# Patient Record
Sex: FEMALE | Employment: FULL TIME | URBAN - METROPOLITAN AREA
[De-identification: names, ages, dates, MRNs, and addresses within clinical notes are randomized per-mention and may not be internally consistent; named-entity substitution may affect disease eponyms.]

---

## 2016-08-04 LAB
HBV SURFACE AG SERPL QL IA: NORMAL
PLATELET # BLD AUTO: NORMAL 10^9/L

## 2016-10-18 LAB
C TRACH DNA SPEC QL PROBE+SIG AMP: NEGATIVE
N GONORRHOEA DNA SPEC QL PROBE+SIG AMP: NEGATIVE

## 2017-01-07 LAB — GROUP B STREP PCR: NEGATIVE

## 2017-01-29 ENCOUNTER — HOSPITAL ENCOUNTER (INPATIENT)
Facility: CLINIC | Age: 27
LOS: 2 days | Discharge: HOME HEALTH | End: 2017-02-01
Attending: OBSTETRICS & GYNECOLOGY | Admitting: OBSTETRICS & GYNECOLOGY
Payer: COMMERCIAL

## 2017-01-29 PROBLEM — Z36.89 ENCOUNTER FOR TRIAGE IN PREGNANT PATIENT: Status: ACTIVE | Noted: 2017-01-29

## 2017-01-29 PROCEDURE — 99215 OFFICE O/P EST HI 40 MIN: CPT | Mod: 25

## 2017-01-29 RX ORDER — PRENATAL VIT/IRON FUM/FOLIC AC 27MG-0.8MG
1 TABLET ORAL DAILY
COMMUNITY

## 2017-01-29 NOTE — IP AVS SNAPSHOT
MRN:[id number]                      After Visit Summary   1/29/2017    Tina Francois    MRN: [id number]           Thank you!     Thank you for choosing [facility] for your care. Our goal is always to provide you with excellent care. Hearing back from our patients is one way we can continue to improve our services. Please take a few minutes to complete the written survey that you may receive in the mail after you visit. If you would like to speak to someone directly about your visit please contact Patient Relations at [phone]. Thank you!          Patient Information     Date Of Birth          1990        About your hospital stay     You were admitted on:  January 29, 2017 You last received care in the:  [facility] Postpartum    You were discharged on:  February 1, 2017       Who to Call     For medical emergencies, please call 911.  For non-urgent questions about your medical care, please call your primary care provider or clinic, [phone]          Attending Provider     Provider    Shanna Rowell MD       Primary Care Provider Office Phone # Fax #    Jake Clayton Cool [phone]       [facility] [address]  [place] [address]        Further instructions from your care team       Postpartum Vaginal Delivery Instructions  [facility] Lactation: [phone]  [facility]: [phone]  Follow up with your primary OB in 6 weeks       Activity       Ask family and friends for help when you need it.    Do not place anything in your vagina for 6 weeks.    You are not restricted on other activities, but take it easy for a few weeks to allow your body to recover from delivery.  You are able to do any activities you feel up to that point.    No driving until you have stopped taking your pain medications (usually two weeks after delivery).     Call your health care provider if you have any of these symptoms:        Increased pain, swelling, redness, or fluid around your stiches from an episiotomy or perineal tear.    A fever above 100.4 F (38 C) with or without chills when placing a thermometer under your tongue.    You soak a sanitary pad with blood within 1 hour, or you see blood clots larger than a golf ball.    Bleeding that lasts more than 6 weeks.    Vaginal discharge that smells bad.    Severe pain, cramping or tenderness in your lower belly area.    A need to urinate more frequently (use the toilet more often), more urgently (use the toilet very quickly), or it burns when you urinate.    Nausea and vomiting.    Redness, swelling or pain around a vein in your leg.    Problems breastfeeding or a red or painful area on your breast.    Chest pain and cough or are gasping for air.    Problems coping with sadness, anxiety, or depression.  If you have any concerns about hurting yourself or the baby, call your provider immediately.     You have questions or concerns after you return home.     Keep your hands clean:  Always wash your hands before touching your perineal area and stitches.  This helps reduce your risk of infection.  If your hands aren't dirty, you may use an alcohol hand-rub to clean your hands. Keep your nails clean and short.        Pending Results     No orders found from 1/28/2017 to 1/30/2017.            Statement of Approval     Ordered          02/01/17 0758  I have reviewed and agree with all the recommendations and orders detailed in this document.   EFFECTIVE NOW     Approved and electronically signed by:  Sayda Carlson PA-C             Admission Information        Provider Department Dept Phone    1/29/2017 Shanna Rowell MD  Postpartum [phone]      Your Vitals Were     Blood Pressure Pulse Temperature    124/84 mmHg 81 98.1  F (36.7  C) (Oral)    Respirations Height Weight    18 1.524 m (5') 66.225 kg (146 lb)    BMI (Body Mass Index) Last Period       28.51 kg/m2 05/01/2016  "(LMP Unknown)       [employer] Information     [employer] lets you send messages to your doctor, view your test results, renew your prescriptions, schedule appointments and more. To sign up, go to [email or web address] . Click on \"Log in\" on the left side of the screen, which will take you to the Welcome page. Then click on \"Sign up Now\" on the right side of the page.     You will be asked to enter the access code listed below, as well as some personal information. Please follow the directions to create your username and password.     Your access code is: [email or web address]  Expires: 2017  8:19 AM     Your access code will  in 90 days. If you need help or a new code, please call your [facility] clinic or [phone].        Care EveryWhere ID     This is your Care EveryWhere ID. This could be used by other organizations to access your [facility] medical records  [id number]           Review of your medicines      START taking        Dose / Directions    acetaminophen 325 MG tablet   Commonly known as:  TYLENOL   Used for:   (normal spontaneous vaginal delivery)        Dose:  650 mg   Take 2 tablets (650 mg) by mouth every 4 hours as needed for mild pain or fever (greater than or equal to 38? C /100.4? F (oral) or 38.5? C/ 101.4? F (core).)   Quantity:  100 tablet   Refills:  0       hydrocortisone 2.5 % cream   Used for:   (normal spontaneous vaginal delivery)        Place rectally 3 times daily as needed (hemorrhoids)   Refills:  0       ibuprofen 400 MG tablet   Commonly known as:  ADVIL/MOTRIN   Used for:   (normal spontaneous vaginal delivery)        Dose:  400-800 mg   Take 1-2 tablets (400-800 mg) by mouth every 6 hours as needed for other (cramping)   Quantity:  120 tablet   Refills:  0       lanolin ointment   Used for:   (normal spontaneous vaginal delivery)        Apply topically every hour as needed for dry skin (sore nipples)   Refills:  0       senna-docusate 8.6-50 MG per tablet "   Commonly known as:  SENOKOT-S;PERICOLACE   Used for:   (normal spontaneous vaginal delivery)        Dose:  1-2 tablet   Take 1-2 tablets by mouth 2 times daily   Quantity:  100 tablet   Refills:  0         CONTINUE these medicines which have NOT CHANGED        Dose / Directions    prenatal multivitamin  plus iron 27-0.8 MG Tabs per tablet        Dose:  1 tablet   Take 1 tablet by mouth daily   Refills:  0       VITAMIN D (CHOLECALCIFEROL) PO        Take by mouth daily   Refills:  0            Where to get your medicines      Some of these will need a paper prescription and others can be bought over the counter. Ask your nurse if you have questions.     You don't need a prescription for these medications    - acetaminophen 325 MG tablet  - hydrocortisone 2.5 % cream  - ibuprofen 400 MG tablet  - lanolin ointment  - senna-docusate 8.6-50 MG per tablet             Protect others around you: Learn how to safely use, store and throw away your medicines at www.disposemymeds.org.             Medication List: This is a list of all your medications and when to take them. Check marks below indicate your daily home schedule. Keep this list as a reference.      Medications           Morning Afternoon Evening Bedtime As Needed    acetaminophen 325 MG tablet   Commonly known as:  TYLENOL   Take 2 tablets (650 mg) by mouth every 4 hours as needed for mild pain or fever (greater than or equal to 38? C /100.4? F (oral) or 38.5? C/ 101.4? F (core).)                                hydrocortisone 2.5 % cream   Place rectally 3 times daily as needed (hemorrhoids)                                ibuprofen 400 MG tablet   Commonly known as:  ADVIL/MOTRIN   Take 1-2 tablets (400-800 mg) by mouth every 6 hours as needed for other (cramping)   Last time this was given:  800 mg on 2017  3:48 AM                                lanolin ointment   Apply topically every hour as needed for dry skin (sore nipples)                                 prenatal multivitamin  plus iron 27-0.8 MG Tabs per tablet   Take 1 tablet by mouth daily                                senna-docusate 8.6-50 MG per tablet   Commonly known as:  SENOKOT-S;PERICOLACE   Take 1-2 tablets by mouth 2 times daily   Last time this was given:  1 tablet on 1/31/2017  9:24 PM                                VITAMIN D (CHOLECALCIFEROL) PO   Take by mouth daily

## 2017-01-29 NOTE — IP AVS SNAPSHOT
[facility] Postpartum    [address] [facility] [address]    Phone:  [phone]    Fax:  [phone]                                       After Visit Summary   1/29/2017    Tina Francois    MRN: [id number]           After Visit Summary Signature Page     I have received my discharge instructions, and my questions have been answered. I have discussed any challenges I see with this plan with the nurse or doctor.    ..........................................................................................................................................  Patient/Patient Representative Signature      ..........................................................................................................................................  Patient Representative Print Name and Relationship to Patient    ..................................................               ................................................  Date                                            Time    ..........................................................................................................................................  Reviewed by Signature/Title    ...................................................              ..............................................  Date                                                            Time

## 2017-01-30 ENCOUNTER — ANESTHESIA EVENT (OUTPATIENT)
Dept: OBGYN | Facility: CLINIC | Age: 27
End: 2017-01-30
Payer: COMMERCIAL

## 2017-01-30 ENCOUNTER — ANESTHESIA (OUTPATIENT)
Dept: OBGYN | Facility: CLINIC | Age: 27
End: 2017-01-30
Payer: COMMERCIAL

## 2017-01-30 LAB
ABO + RH BLD: NORMAL
ABO + RH BLD: NORMAL
SPECIMEN EXP DATE BLD: NORMAL
T PALLIDUM IGG+IGM SER QL: NEGATIVE

## 2017-01-30 PROCEDURE — 25000125 ZZHC RX 250

## 2017-01-30 PROCEDURE — 72200001 ZZH LABOR CARE VAGINAL DELIVERY SINGLE

## 2017-01-30 PROCEDURE — 12000029 ZZH R&B OB INTERMEDIATE

## 2017-01-30 PROCEDURE — 40000671 ZZH STATISTIC ANESTHESIA CASE

## 2017-01-30 PROCEDURE — 25000125 ZZHC RX 250: Performed by: OBSTETRICS & GYNECOLOGY

## 2017-01-30 PROCEDURE — 3E0S3CZ INTRODUCTION OF REGIONAL ANESTHETIC INTO EPIDURAL SPACE, PERCUTANEOUS APPROACH: ICD-10-PCS | Performed by: OBSTETRICS & GYNECOLOGY

## 2017-01-30 PROCEDURE — 86901 BLOOD TYPING SEROLOGIC RH(D): CPT | Performed by: OBSTETRICS & GYNECOLOGY

## 2017-01-30 PROCEDURE — 37000011 ZZH ANESTHESIA WARD SERVICE

## 2017-01-30 PROCEDURE — 25000128 H RX IP 250 OP 636

## 2017-01-30 PROCEDURE — 25000132 ZZH RX MED GY IP 250 OP 250 PS 637: Performed by: OBSTETRICS & GYNECOLOGY

## 2017-01-30 PROCEDURE — 86900 BLOOD TYPING SEROLOGIC ABO: CPT | Performed by: OBSTETRICS & GYNECOLOGY

## 2017-01-30 PROCEDURE — 86780 TREPONEMA PALLIDUM: CPT | Performed by: OBSTETRICS & GYNECOLOGY

## 2017-01-30 PROCEDURE — 0KQM0ZZ REPAIR PERINEUM MUSCLE, OPEN APPROACH: ICD-10-PCS | Performed by: OBSTETRICS & GYNECOLOGY

## 2017-01-30 PROCEDURE — 10907ZC DRAINAGE OF AMNIOTIC FLUID, THERAPEUTIC FROM PRODUCTS OF CONCEPTION, VIA NATURAL OR ARTIFICIAL OPENING: ICD-10-PCS | Performed by: OBSTETRICS & GYNECOLOGY

## 2017-01-30 PROCEDURE — 25800025 ZZH RX 258: Performed by: OBSTETRICS & GYNECOLOGY

## 2017-01-30 PROCEDURE — 00HU33Z INSERTION OF INFUSION DEVICE INTO SPINAL CANAL, PERCUTANEOUS APPROACH: ICD-10-PCS | Performed by: OBSTETRICS & GYNECOLOGY

## 2017-01-30 RX ORDER — OXYTOCIN 10 [USP'U]/ML
10 INJECTION, SOLUTION INTRAMUSCULAR; INTRAVENOUS
Status: DISCONTINUED | OUTPATIENT
Start: 2017-01-30 | End: 2017-01-30

## 2017-01-30 RX ORDER — ONDANSETRON 2 MG/ML
4 INJECTION INTRAMUSCULAR; INTRAVENOUS EVERY 6 HOURS PRN
Status: DISCONTINUED | OUTPATIENT
Start: 2017-01-30 | End: 2017-01-30

## 2017-01-30 RX ORDER — OXYTOCIN/0.9 % SODIUM CHLORIDE 30/500 ML
340 PLASTIC BAG, INJECTION (ML) INTRAVENOUS CONTINUOUS PRN
Status: DISCONTINUED | OUTPATIENT
Start: 2017-01-30 | End: 2017-02-01 | Stop reason: HOSPADM

## 2017-01-30 RX ORDER — SODIUM CHLORIDE, SODIUM LACTATE, POTASSIUM CHLORIDE, CALCIUM CHLORIDE 600; 310; 30; 20 MG/100ML; MG/100ML; MG/100ML; MG/100ML
INJECTION, SOLUTION INTRAVENOUS CONTINUOUS
Status: DISCONTINUED | OUTPATIENT
Start: 2017-01-30 | End: 2017-01-30

## 2017-01-30 RX ORDER — OXYTOCIN/0.9 % SODIUM CHLORIDE 30/500 ML
100-340 PLASTIC BAG, INJECTION (ML) INTRAVENOUS CONTINUOUS PRN
Status: COMPLETED | OUTPATIENT
Start: 2017-01-30 | End: 2017-01-30

## 2017-01-30 RX ORDER — IBUPROFEN 800 MG/1
800 TABLET, FILM COATED ORAL
Status: DISCONTINUED | OUTPATIENT
Start: 2017-01-30 | End: 2017-01-30

## 2017-01-30 RX ORDER — IBUPROFEN 400 MG/1
400-800 TABLET, FILM COATED ORAL EVERY 6 HOURS PRN
Status: DISCONTINUED | OUTPATIENT
Start: 2017-01-30 | End: 2017-02-01 | Stop reason: HOSPADM

## 2017-01-30 RX ORDER — OXYCODONE AND ACETAMINOPHEN 5; 325 MG/1; MG/1
1 TABLET ORAL
Status: DISCONTINUED | OUTPATIENT
Start: 2017-01-30 | End: 2017-01-30

## 2017-01-30 RX ORDER — NALOXONE HYDROCHLORIDE 0.4 MG/ML
.1-.4 INJECTION, SOLUTION INTRAMUSCULAR; INTRAVENOUS; SUBCUTANEOUS
Status: DISCONTINUED | OUTPATIENT
Start: 2017-01-30 | End: 2017-01-30

## 2017-01-30 RX ORDER — BISACODYL 10 MG
10 SUPPOSITORY, RECTAL RECTAL DAILY PRN
Status: DISCONTINUED | OUTPATIENT
Start: 2017-02-01 | End: 2017-02-01 | Stop reason: HOSPADM

## 2017-01-30 RX ORDER — METHYLERGONOVINE MALEATE 0.2 MG/ML
200 INJECTION INTRAVENOUS
Status: DISCONTINUED | OUTPATIENT
Start: 2017-01-30 | End: 2017-01-30

## 2017-01-30 RX ORDER — FENTANYL CITRATE 50 UG/ML
50-100 INJECTION, SOLUTION INTRAMUSCULAR; INTRAVENOUS
Status: DISCONTINUED | OUTPATIENT
Start: 2017-01-30 | End: 2017-01-30

## 2017-01-30 RX ORDER — EPHEDRINE SULFATE 50 MG/ML
INJECTION, SOLUTION INTRAMUSCULAR; INTRAVENOUS; SUBCUTANEOUS
Status: COMPLETED
Start: 2017-01-30 | End: 2017-01-30

## 2017-01-30 RX ORDER — LANOLIN 100 %
OINTMENT (GRAM) TOPICAL
Status: DISCONTINUED | OUTPATIENT
Start: 2017-01-30 | End: 2017-02-01 | Stop reason: HOSPADM

## 2017-01-30 RX ORDER — OXYTOCIN/0.9 % SODIUM CHLORIDE 30/500 ML
100 PLASTIC BAG, INJECTION (ML) INTRAVENOUS CONTINUOUS
Status: DISCONTINUED | OUTPATIENT
Start: 2017-01-30 | End: 2017-02-01 | Stop reason: HOSPADM

## 2017-01-30 RX ORDER — EPHEDRINE SULFATE 50 MG/ML
5 INJECTION, SOLUTION INTRAMUSCULAR; INTRAVENOUS; SUBCUTANEOUS
Status: DISCONTINUED | OUTPATIENT
Start: 2017-01-30 | End: 2017-01-30

## 2017-01-30 RX ORDER — LIDOCAINE 40 MG/G
CREAM TOPICAL
Status: DISCONTINUED | OUTPATIENT
Start: 2017-01-30 | End: 2017-01-30

## 2017-01-30 RX ORDER — AMOXICILLIN 250 MG
1-2 CAPSULE ORAL 2 TIMES DAILY
Status: DISCONTINUED | OUTPATIENT
Start: 2017-01-30 | End: 2017-02-01 | Stop reason: HOSPADM

## 2017-01-30 RX ORDER — HYDROCORTISONE 2.5 %
CREAM (GRAM) TOPICAL 3 TIMES DAILY PRN
Status: DISCONTINUED | OUTPATIENT
Start: 2017-01-30 | End: 2017-02-01 | Stop reason: HOSPADM

## 2017-01-30 RX ORDER — CARBOPROST TROMETHAMINE 250 UG/ML
250 INJECTION, SOLUTION INTRAMUSCULAR
Status: DISCONTINUED | OUTPATIENT
Start: 2017-01-30 | End: 2017-01-30

## 2017-01-30 RX ORDER — ACETAMINOPHEN 325 MG/1
650 TABLET ORAL EVERY 4 HOURS PRN
Status: DISCONTINUED | OUTPATIENT
Start: 2017-01-30 | End: 2017-01-30

## 2017-01-30 RX ORDER — MISOPROSTOL 200 UG/1
400 TABLET ORAL
Status: DISCONTINUED | OUTPATIENT
Start: 2017-01-30 | End: 2017-02-01 | Stop reason: HOSPADM

## 2017-01-30 RX ORDER — NALBUPHINE HYDROCHLORIDE 10 MG/ML
2.5-5 INJECTION, SOLUTION INTRAMUSCULAR; INTRAVENOUS; SUBCUTANEOUS EVERY 6 HOURS PRN
Status: DISCONTINUED | OUTPATIENT
Start: 2017-01-30 | End: 2017-01-30

## 2017-01-30 RX ORDER — ACETAMINOPHEN 325 MG/1
650 TABLET ORAL EVERY 4 HOURS PRN
Status: DISCONTINUED | OUTPATIENT
Start: 2017-01-30 | End: 2017-02-01 | Stop reason: HOSPADM

## 2017-01-30 RX ORDER — OXYTOCIN 10 [USP'U]/ML
10 INJECTION, SOLUTION INTRAMUSCULAR; INTRAVENOUS
Status: DISCONTINUED | OUTPATIENT
Start: 2017-01-30 | End: 2017-02-01 | Stop reason: HOSPADM

## 2017-01-30 RX ORDER — NALOXONE HYDROCHLORIDE 0.4 MG/ML
.1-.4 INJECTION, SOLUTION INTRAMUSCULAR; INTRAVENOUS; SUBCUTANEOUS
Status: DISCONTINUED | OUTPATIENT
Start: 2017-01-30 | End: 2017-02-01 | Stop reason: HOSPADM

## 2017-01-30 RX ORDER — TERBUTALINE SULFATE 1 MG/ML
INJECTION, SOLUTION SUBCUTANEOUS
Status: DISCONTINUED
Start: 2017-01-30 | End: 2017-01-30 | Stop reason: WASHOUT

## 2017-01-30 RX ADMIN — SENNOSIDES AND DOCUSATE SODIUM 1 TABLET: 8.6; 5 TABLET ORAL at 21:27

## 2017-01-30 RX ADMIN — IBUPROFEN 800 MG: 400 TABLET ORAL at 09:00

## 2017-01-30 RX ADMIN — Medication: at 02:02

## 2017-01-30 RX ADMIN — SODIUM CHLORIDE, POTASSIUM CHLORIDE, SODIUM LACTATE AND CALCIUM CHLORIDE: 600; 310; 30; 20 INJECTION, SOLUTION INTRAVENOUS at 02:13

## 2017-01-30 RX ADMIN — SENNOSIDES AND DOCUSATE SODIUM 2 TABLET: 8.6; 5 TABLET ORAL at 09:00

## 2017-01-30 RX ADMIN — Medication 5 MG: at 03:05

## 2017-01-30 RX ADMIN — SODIUM CHLORIDE, POTASSIUM CHLORIDE, SODIUM LACTATE AND CALCIUM CHLORIDE 1000 ML: 600; 310; 30; 20 INJECTION, SOLUTION INTRAVENOUS at 01:28

## 2017-01-30 RX ADMIN — SODIUM CHLORIDE, POTASSIUM CHLORIDE, SODIUM LACTATE AND CALCIUM CHLORIDE: 600; 310; 30; 20 INJECTION, SOLUTION INTRAVENOUS at 03:36

## 2017-01-30 RX ADMIN — OXYTOCIN-SODIUM CHLORIDE 0.9% IV SOLN 30 UNIT/500ML 340 ML/HR: 30-0.9/5 SOLUTION at 06:10

## 2017-01-30 RX ADMIN — EPHEDRINE SULFATE 5 MG: 50 INJECTION, SOLUTION INTRAMUSCULAR; INTRAVENOUS; SUBCUTANEOUS at 03:05

## 2017-01-30 RX ADMIN — IBUPROFEN 800 MG: 400 TABLET ORAL at 15:01

## 2017-01-30 NOTE — PROGRESS NOTES
Data: Tina Francois transferred to [facility] via wheelchair at 845am. Baby transferred via mother;'s arms.  Action: Receiving unit notified of transfer: Yes. Patient and family notified of room change. Report given to Tahira COY RN at 0815. Belongings sent to receiving unit. Accompanied by Registered Nurse. Oriented patient to surroundings. Call light within reach. ID bands double-checked with receiving RN.  Response: Patient tolerated transfer and is stable. Pt yet to void post delivery. Breast feeding with shield.

## 2017-01-30 NOTE — PROVIDER NOTIFICATION
01/30/17 0224   Provider Notification   Provider Name/Title Dr. Horan   Method of Notification In Department   Request Evaluate in Person   In house MD called to bedside at 0224 for PD after epidural placement. PD down to 70's despite position changes (L/R/ hands and knees), IV bolus, 02. SVE  From RN 4.5 cm, intact. BP WDL with exception of one low BP 4.5 min prior. In house called to bedside. Dr. Horan AROM pt for clear fluid and SVE 5cm. FSE placed. During this time FHR resolved 135 with moderate variability. Total time of PD 8 minutes. MD at bedside to monitor. RN to continue to monitor.

## 2017-01-30 NOTE — PROVIDER NOTIFICATION
01/30/17 0323   Provider Notification   Provider Name/Title Dr. Rowell   Method of Notification Phone   Request Evaluate - Remote   MD called unit after reviewing strip at home. Aware of recurrent VD. SVE is 6.5/100/0. Pt was just repositioned and catheter placed. No new orders received.

## 2017-01-30 NOTE — ANESTHESIA PROCEDURE NOTES
Peripheral nerve/Neuraxial procedure note :        Assessment/Narrative  .  .  . Comments:  Pre-Procedure  Performed by Ge Mccormick MD  Location: OB.      PreAnesthestic Checklist: patient identified, IV checked, risks and benefits discussed, informed consent obtained, monitors and equipment checked, pre-op evaluation and at physician/surgeon's request.    Timeout   Correct Patient: Yes  Correct Procedure: Epidural catheter placement  Correct Site: Yes   Correct Position: Yes    Procedure Documentation  Procedure:   Epidural catheter block for Labor    Patient currently in labor and she and OBMD request a labor epidural to control her labor pains. Patient was interviewed and examined. Procedure and risks including but not limited to bleeding, infection, nerve injury, paralysis, PDPH, and inadequate block requiring intervention discussed with patient. Questions answered. This epidural is to be placed in anticipation of vaginal delivery.  She consents to the epidural procedure.  Time-out was performed.  I or my partners remain immediately available for management of any issues or complications and will monitor at appropriate intervals.  Procedure: Patient sitting. Betadine prep x 3. Sterile drape applied.  Lidocaine 1%  local infiltration at L 3-4.  17 G. Tuohy needle at L3-4 by loss of resistance into epidural space.  No CSF, paresthesia or blood. 1.5 % Lidocaine with 1:200,000 Epinephrine 5cc test dose. Then 0.25% bupivicaine 10 cc with NS 5 cc.  Epidural catheter inserted w/o resistance to 5 cm in epidural space.  Aspiration negative for blood and CSF.   Negative for neuro change, paresthesia or symptoms of intravascular injection or intrathecal injection.  Infusion orders written and infusion of 0.125% bupivicaine 15cc per hour started.    Ge Mccormick MD

## 2017-01-30 NOTE — PROVIDER NOTIFICATION
01/30/17 0402   Provider Notification   Provider Name/Title Dr. Rowell   Method of Notification Phone   Request Evaluate - Remote   Notification Reason Status Update   MD updated that pt continues to have recurrent VD with contractions, moderate variability between decels. Bloody show noted. Pt denies any pressure or urge to push. Orders received to perform SVE around 0430.

## 2017-01-30 NOTE — BRIEF OP NOTE
Delivery Summary:    1. IUP at 39+1/7 wks. Spontaneous labor.   2. AROM with clear fluid, meconium staning through labor.   3. Epidural for pain management.   4.  of male infant in MARYAN presentation. NICU present. No nuchal cord. Delayed clamp done.  5. Placenta intact with 3-v cord. Pitocin after.   6. Perineum with 2nd degree lac and left labial lac repaired with 3-0 and 4-0 vicryl.   7. Dr. Rowell for delivery.     Shanna Rowell

## 2017-01-30 NOTE — PLAN OF CARE
Problem: Individualization  Goal: Patient Preferences  Outcome: No Change  Pt admitted to mom-baby and VSS. Voiding well and up independently. Ice and ibu with relief of pain.  at bedside and supportive. Using nipple shield with feedings.

## 2017-01-30 NOTE — PROVIDER NOTIFICATION
01/30/17 0417   Provider Notification   Provider Name/Title Dr. Rowell   Method of Notification Phone   MD called to department for delivery

## 2017-01-30 NOTE — ANESTHESIA PREPROCEDURE EVALUATION
PAC NOTE:       ANESTHESIA PRE EVALUATION:  Anesthesia Evaluation       history and physical reviewed .        ROS/MED HX    ENT/Pulmonary:  - neg pulmonary ROS     Neurologic:  - neg neurologic ROS     Cardiovascular:  - neg cardiovascular ROS       METS/Exercise Tolerance:     Hematologic:         Musculoskeletal:         GI/Hepatic:  - neg GI/hepatic ROS       Renal/Genitourinary:         Endo:         Psychiatric:         Infectious Disease:         Malignancy:         Other:               Physical Exam  Normal systems: cardiovascular, pulmonary and dental    Airway   Mallampati: II  TM distance: > 3 FB  Neck ROM: full  Mouth opening: > 3 cm    Dental     Cardiovascular       Pulmonary         neg OB ROS            Anesthesia Plan      History & Physical Review      ASA Status:  .  OB Epidural Asa: 2            Postoperative Care      Consents  Anesthetic plan, risks, benefits and alternatives discussed with:  Patient..                            .

## 2017-01-30 NOTE — PROVIDER NOTIFICATION
01/29/17 0161   Provider Notification   Provider Name/Title Dr. Rowell   Method of Notification Phone   Request Evaluate - Remote   Notification Reason Patient Arrived;SVE;Status Update;Uterine Activity;Labor Status;Membrane Status   MD updated re: pt arrival, cx started 2200 on 1/28/17, pt denies LOF, HA, vision changes, epigastric pain, reflexes WDL, VSS. Cx since arrival have been 1-4 minutes apart pt visibly in pain but able to breathe through them. Fetal tracing category 1 baseline 135. SVE 2/80/-2 change from Monday 1/70/-2. Orders received to have pt walk. If any cervical change and pt still in pain okay to admit with standard intrapartum orders and pain medications.

## 2017-01-30 NOTE — H&P
2017      Assessed pt after called for prolonged deceleration (in house).  Pt is a 27yo  @ 39.1 wga admitted early this AM for labor.  She received an epidural ~ 2 AM, and at 2:21 fetal tracing exhibited a prolonged deceleration, down to the 70-80s, lasting for approximately 8 minutes.  SVE at this time was 4-5cm/90/-3, and UCs did not exhibit tachysystole.  BPs were /54-62. BP prior to epidural was 129/76.  Amniotomy was performed and FECG placed with clear fluid noted.  After other resuscitative measures, including O2 mask, IVFs, and positional changes, FHT returned to baseline 130s with moderate variability.  PNC has been uncomplicated other than late transfer of care at ~23 wga.  There have been no other changes in her medical history based upon review of chart and examination.    /56 mmHg  Temp(Src) 97.3  F (36.3  C) (Oral)  Ht 1.524 m (5')  Wt 66.225 kg (146 lb)  BMI 28.51 kg/m2  LMP 2016 (LMP Unknown)  Gen: NAD, A&O x 3  Abd: gravid, NT  SVE: 5/90/-3, amniotomy with clear fluid, FECG placed  FHT: 130, mod variability, +accel, occasional variable decel with contraction  TOCO: Q 2-3 min      A/P: 27yo  @ 39.1 wga here for labor.  Afebrile, VSS.  - Prolonged deceleration after epidural, potentially 2/2 drop in BP.  Monitor closely.  FHT improved on L lateral tilt.  Continue expectant management        MARIA ELENA BENOIT MD

## 2017-01-30 NOTE — PROVIDER NOTIFICATION
01/30/17 0253   Provider Notification   Provider Name/Title Dr. Rowell   Method of Notification Phone   Request Evaluate - Remote   Notification Reason Status Update   MD updated re: admission, epidural received, 8 min PD, interventions, cx, SVE, In house dr. Horan arrival and AROM, resolution of decel. Fetal tracing currently with moderate variability, no accels and intermittent VD. No new orders received. Will continue to monitor,

## 2017-01-30 NOTE — PROVIDER NOTIFICATION
01/30/17 0145   Provider Notification   Provider Name/Title MDA   Method of Notification Phone   MDA will put in orders and come to place epidural

## 2017-01-30 NOTE — PROGRESS NOTES
S: Pt is comfortable with epidural. Denies any sensation of pressure.     O:  /62 mmHg  Temp(Src) 97.7  F (36.5  C) (Oral)  Ht 1.524 m (5')  Wt 66.225 kg (146 lb)  BMI 28.51 kg/m2  LMP 2016 (LMP Unknown)  SVE: 90/0 per nursing  TOCO: Q1-2 minutes  FHR: 140's baseline. Moderate baseline varability. Recurrent variable decelerations with return to baseline. 3.5 minute decel with baseline at 90's and return to baseline.     A/P: 27 yo  at 39+1/7 wks.    1. Anticipate . Recheck in 20 minutes  2. Epidural for pain management.  3. GBS negative. RH positive.     Shanna Rowell

## 2017-01-30 NOTE — PROVIDER NOTIFICATION
01/30/17 0450   Provider Notification   Provider Name/Title Dr. Rowell    Method of Notification At Bedside   Request Evaluate in Person   MD at bedside to evaluate pt. POC is for   MD to SVE pt in approx 20 minutes.

## 2017-01-30 NOTE — L&D DELIVERY NOTE
PROCEDURE:  Leandra Francois is a 26-year-old G1, P0 at 39 and 1/7 week.  She began having latent labor for the past day and a half.  She called with regular painful contractions and was admitted to Labor and Delivery.  She was found to be in active labor and underwent assisted rupture of membranes.  She had received an epidural for pain management and had a delayed prolonged deceleration for approximately 8 minutes, at which time Dr. Cool was in the prone.  Position changes and fluid bolus were performed and this did resolve.  She continued to labor on her own and made it to complete cervical dilation.  She then began actively pushing and delivered a male infant in the MARYAN presentation.  There was meconium fluid noted to be during the pushing efforts and NICU was present for delivery.  There was no evidence of nuchal cord at the time of delivery.  The remainder of the infant delivered and was placed on the maternal abdomen for delayed cord clamp.  The cord was then clamped by myself and cut by the father.  The placenta delivered spontaneously Schultze presentation, intact with 3-vessel cord.  There was a second-degree perineal laceration that was repaired with 3-0 Vicryl and a left labial laceration that was repaired with 4-0 Vicryl.  At the end of the procedure, all counts were correct, debrief was performed and she will be transferred to postpartum in stable condition.         MARI DOMINGUEZ MD             D: 2017 06:27   T: 2017 08:32   MT: ARIANNA#[id number]      Name:     LEANDRA FRANCOIS   MRN:      [id number]        Account:        [id number]   :      1990           Delivery Date:  2017      Document: [id number]

## 2017-01-31 LAB — HGB BLD-MCNC: 10.7 G/DL (ref 11.7–15.7)

## 2017-01-31 PROCEDURE — 25000132 ZZH RX MED GY IP 250 OP 250 PS 637: Performed by: OBSTETRICS & GYNECOLOGY

## 2017-01-31 PROCEDURE — 85018 HEMOGLOBIN: CPT | Performed by: OBSTETRICS & GYNECOLOGY

## 2017-01-31 PROCEDURE — 40000084 ZZH STATISTIC IP LACTATION SERVICES 16-30 MIN

## 2017-01-31 PROCEDURE — 36415 COLL VENOUS BLD VENIPUNCTURE: CPT | Performed by: OBSTETRICS & GYNECOLOGY

## 2017-01-31 PROCEDURE — 12000027 ZZH R&B OB

## 2017-01-31 RX ADMIN — IBUPROFEN 800 MG: 400 TABLET ORAL at 21:24

## 2017-01-31 RX ADMIN — SENNOSIDES AND DOCUSATE SODIUM 1 TABLET: 8.6; 5 TABLET ORAL at 08:55

## 2017-01-31 RX ADMIN — IBUPROFEN 800 MG: 400 TABLET ORAL at 15:58

## 2017-01-31 RX ADMIN — IBUPROFEN 800 MG: 400 TABLET ORAL at 08:55

## 2017-01-31 RX ADMIN — IBUPROFEN 800 MG: 400 TABLET ORAL at 00:46

## 2017-01-31 RX ADMIN — SENNOSIDES AND DOCUSATE SODIUM 1 TABLET: 8.6; 5 TABLET ORAL at 21:24

## 2017-01-31 NOTE — ANESTHESIA POSTPROCEDURE EVALUATION
Patient: Tina Francois    LABOR EPIDURAL  Additional Information* No procedures listed *    Diagnosis:* No pre-op diagnosis entered *  Diagnosis Additional Information: labor    Anesthesia Type:  Epidural    Note:  Anesthesia Post Evaluation    Patient location during evaluation: PACU  Patient participation: Able to fully participate in evaluation  Level of consciousness: awake  Pain management: adequate  Airway patency: patent  Cardiovascular status: acceptable  Respiratory status: acceptable  Hydration status: acceptable  PONV: controlled     Anesthetic complications: None    Comments: .Labor Epidural Post delivery note.      Doing well. VSS Temp normal. Satisfactory respiratory and cardiovascular function. Return of neurologic function. Questions encouraged and answered. Denies positional headache. Minimal side effects easily managed w/ PRN meds. No apparent anesthetic complications. No follow-up required.    I or my partner was immediately available for epidural management.    DEBBIE Marie              Last vitals:  Filed Vitals:    01/30/17 0750 01/30/17 1646 01/31/17 0046   BP: 104/68 113/70 117/75   Pulse:  86 80   Temp: 99.3  F (37.4  C) 98.3  F (36.8  C) 98.3  F (36.8  C)   Resp: 18 18 18       Electronically Signed By: Regis Marie DO  January 31, 2017  8:07 AM

## 2017-01-31 NOTE — PROGRESS NOTES
[facility]   Obstetrics Progress Note    Subjective: This is the patient's first day since Vaginal delivery. She is doing well.  She is urinating on her own. Pain is controlled with medication.    Objective:   All vitals stable    EXAM:  Constitutional: healthy, alert, no distress.   Abdomen: Abdomen soft, non-tender. BS normal. No masses, fundus is firm.  JOINT/EXTREMITIES: extremities normal     Last hemoglobin was   HEMOGLOBIN   Date Value Ref Range Status   01/31/2017 10.7* 11.7 - 15.7 g/dL Final   ]    Assessment: Stable postpartum course.    Plan: Routine care. Anticipate discharge tomorrow    Sonia Hodges

## 2017-01-31 NOTE — PLAN OF CARE
Problem: Goal Outcome Summary  Goal: Goal Outcome Summary  Outcome: No Change  Continues to work on breast feeding, baby sleepy, encouraged skin to skin. Nursing with shield.  Ibuprofen for pain with stated relief, denies difficulty voiding. FOB present and supportive, participating in baby cares.

## 2017-01-31 NOTE — ADDENDUM NOTE
Addendum  created 01/31/17 0808 by Regis Marie, DO    Modules edited: Clinical Notes    Clinical Notes:  File: [id number]; File: [id number]

## 2017-01-31 NOTE — PLAN OF CARE
Problem: Goal Outcome Summary  Goal: Goal Outcome Summary  Outcome: Improving  VSS. Pain controlled using ibuprofen. Pt teary during breastfeeding. Was concerned that baby was not interested. Assisted with lactation and different positions. Pt needs to be reassured. Latch score of 5. Infant needs stimulation. Encouraged pt to keep nursing every 2-3 hours. Continue to monitor

## 2017-01-31 NOTE — PLAN OF CARE
Problem: Goal Outcome Summary  Goal: Goal Outcome Summary  Patient meeting expected goals this shift. Up ad lang in room, voiding without difficulty. Pain controlled with use of oral ibuprofen. Intermittently tearful about struggling with breastfeeding.

## 2017-02-01 VITALS
RESPIRATION RATE: 16 BRPM | BODY MASS INDEX: 28.66 KG/M2 | DIASTOLIC BLOOD PRESSURE: 72 MMHG | HEART RATE: 81 BPM | TEMPERATURE: 97.7 F | SYSTOLIC BLOOD PRESSURE: 113 MMHG | HEIGHT: 60 IN | WEIGHT: 146 LBS

## 2017-02-01 PROCEDURE — 25000132 ZZH RX MED GY IP 250 OP 250 PS 637: Performed by: OBSTETRICS & GYNECOLOGY

## 2017-02-01 RX ORDER — AMOXICILLIN 250 MG
1-2 CAPSULE ORAL 2 TIMES DAILY
Qty: 100 TABLET | Status: ON HOLD | COMMUNITY
Start: 2017-02-01 | End: 2017-04-19

## 2017-02-01 RX ORDER — HYDROCORTISONE 2.5 %
CREAM (GRAM) TOPICAL 3 TIMES DAILY PRN
Status: ON HOLD | COMMUNITY
Start: 2017-02-01 | End: 2017-04-19

## 2017-02-01 RX ORDER — IBUPROFEN 400 MG/1
400-800 TABLET, FILM COATED ORAL EVERY 6 HOURS PRN
Qty: 120 TABLET | Status: ON HOLD | COMMUNITY
Start: 2017-02-01 | End: 2019-10-26

## 2017-02-01 RX ORDER — LANOLIN 100 %
OINTMENT (GRAM) TOPICAL
Status: ON HOLD | COMMUNITY
Start: 2017-02-01 | End: 2019-10-26

## 2017-02-01 RX ORDER — ACETAMINOPHEN 325 MG/1
650 TABLET ORAL EVERY 4 HOURS PRN
Qty: 100 TABLET | Status: ON HOLD | COMMUNITY
Start: 2017-02-01 | End: 2019-10-26

## 2017-02-01 RX ADMIN — IBUPROFEN 800 MG: 400 TABLET ORAL at 03:48

## 2017-02-01 RX ADMIN — SENNOSIDES AND DOCUSATE SODIUM 2 TABLET: 8.6; 5 TABLET ORAL at 08:40

## 2017-02-01 NOTE — LACTATION NOTE
Lactation in to see patient. Patient says baby has been slow at breast. Assisted with feed. Baby latched well to shield. Nursed well with swallows.  Encouraged patient to do breast compressions when nursing to increase swallows. Colostrum seen in shield at end of feed. In for a second feed. Baby latched well, with swallows. Mom had been pumping after feeds for sleepy baby. Will call prn

## 2017-02-01 NOTE — DISCHARGE INSTRUCTIONS
Postpartum Vaginal Delivery Instructions  [facility]: [phone]  [facility]: [phone]  Follow up with your primary OB in 6 weeks       Activity       Ask family and friends for help when you need it.    Do not place anything in your vagina for 6 weeks.    You are not restricted on other activities, but take it easy for a few weeks to allow your body to recover from delivery.  You are able to do any activities you feel up to that point.    No driving until you have stopped taking your pain medications (usually two weeks after delivery).     Call your health care provider if you have any of these symptoms:       Increased pain, swelling, redness, or fluid around your stiches from an episiotomy or perineal tear.    A fever above 100.4 F (38 C) with or without chills when placing a thermometer under your tongue.    You soak a sanitary pad with blood within 1 hour, or you see blood clots larger than a golf ball.    Bleeding that lasts more than 6 weeks.    Vaginal discharge that smells bad.    Severe pain, cramping or tenderness in your lower belly area.    A need to urinate more frequently (use the toilet more often), more urgently (use the toilet very quickly), or it burns when you urinate.    Nausea and vomiting.    Redness, swelling or pain around a vein in your leg.    Problems breastfeeding or a red or painful area on your breast.    Chest pain and cough or are gasping for air.    Problems coping with sadness, anxiety, or depression.  If you have any concerns about hurting yourself or the baby, call your provider immediately.     You have questions or concerns after you return home.     Keep your hands clean:  Always wash your hands before touching your perineal area and stitches.  This helps reduce your risk of infection.  If your hands aren't dirty, you may use an alcohol hand-rub to clean your hands. Keep your nails clean and short.

## 2017-02-01 NOTE — PLAN OF CARE
Problem: Goal Outcome Summary  Goal: Goal Outcome Summary  Outcome: Improving  Pt ambulatory and independent with self cares and infant cares.  Lactation RN available this shift, worked with mom and infant with good results.  Mom verbalized that breastfeeding is going much better. Tolerating regular diet well.  Pain managed well with ibuprofen.  Support person present and helpful to mom and with infant cares.  Anticipate discharge home tomorrow.

## 2017-02-01 NOTE — PLAN OF CARE
Problem: Goal Outcome Summary  Goal: Goal Outcome Summary  Outcome: Improving  VS stable, pain managed with ibuprofen. Independent in cares for self and infant. Breastfeeding with assist of shield, pt reminded to bring baby to breast, not breast to baby. FOB asleep at bedside all night. Meeting expected goals.

## 2017-02-01 NOTE — PROGRESS NOTES
#2 Postpartum VD 39 weeks.    Pt states doing well. Pain well controlled.  Nursing.    Afebrile, VSS. HGB 10.7  Fundus firm. Moderate flow.  Voiding w/o problems.  Had BM.  Ext: w./o edema or pain    Normal postpartum course.    Plan routine postpartum care.  Discharge to home.   Recheck in 6 weeks or prn at [facility].

## 2017-02-01 NOTE — PLAN OF CARE
Pt discharging to home with spouse and infant. Pt knows when to follow up in clinic. Home Care referral sent. All questions answered at this time.

## 2017-02-01 NOTE — PLAN OF CARE
Problem: Goal Outcome Summary  Goal: Goal Outcome Summary  Outcome: Adequate for Discharge Date Met:  02/01/17  Data: Vital signs within normal limits. Postpartum checks within normal limits - see flow record. Patient eating and drinking normally. Patient able to empty bladder independently and is up ambulating. No apparent signs of infection. Laceration healing well. Patient performing self cares and is able to care for infant.  Action: Patient medicated during her stay for pain and cramping. See MAR. Patient reassessed within 1 hour after each medication and pain was improved - patient stated she was comfortable. Patient education done about discharge instructions, take home medications, post partum depression, and follow up appointments. Patient verbalized understanding of all discharge instructions and states she has no further questions/concerns at this time. Patient and spouse viewed videos about shaken baby syndrome and post partum depression and verbalize understanding of both.  Response: Positive attachment behaviors observed with infant. Support persons present.   Plan: Anticipate discharge home with infant.

## 2017-02-10 ENCOUNTER — TELEPHONE (OUTPATIENT)
Dept: OBGYN | Facility: CLINIC | Age: 27
End: 2017-02-10

## 2017-02-24 ENCOUNTER — OFFICE VISIT (OUTPATIENT)
Dept: PEDIATRICS | Facility: CLINIC | Age: 27
End: 2017-02-24
Attending: OBSTETRICS & GYNECOLOGY
Payer: COMMERCIAL

## 2017-02-24 PROCEDURE — 99214 OFFICE O/P EST MOD 30 MIN: CPT | Mod: ZF

## 2017-02-24 NOTE — PATIENT INSTRUCTIONS
Plan of Care:   1. Continue breast feeding using 24 mm nipple shield, maybe wean shield in a couple weeks if lumps near nipple shield persist  2. Use cradle hold with his tummy turned towards you, one arm is down behind you, almost hugging you, with his head in the crook of your elbow  3. Use your boppy or support pillows to support your arms, not [place]  4. Swaddle him at night, hands up by his face so he can suck on them and soothe himself to sleep  5. Feed on one side as completely as possible before going to the other side  6. Continue using lecithin 3 capsules 3x/day for another  2 weeks before weaning number of capsules per day    Lactation Consultant: ANITA QuinteroN, RN, IBCLC    Start time:1 pm End time: 2pm    60 minute Lactation Consultation with > 50% of time spent on breastfeeding counseling and coordination of care.    [facility]  Phone (Appts): [phone]    Nurse Line: [phone]

## 2017-02-24 NOTE — PROGRESS NOTES
Lactation Follow Up Visit    Date of Visit: February 24, 2017    Baby's current age: 26 days (38 wks at birth)    Reason for Visit: still having plugged ducts    Date of last weight:  Feb 13 done at  7lb 8 ounces   Last weight:  7lb 8 ounces  (3402 gm)    Current Weight: 3938 gm (with onesies on)     Feeding Issues/Changes since last visit: still having some small lumps around aerola that don't go away    Supplementing: None    Pumping: infrequently    Meds/Herbals:preanatal vits, fish oil, lecithin, antibiotic for mastistis    Output :  WNL     Other: Tina is finishing a course of antibiotics for mastistis     Feeding Assessment/Weights  62 mL LEFT breast after 15 minutes   26   mL RIGHT breast after 10 minutes       Total  88 mL or almost 3 oz after 25 minutes      Summary: Brock has a strong suck, latches well with nipple shield, audible swallowing. Needing some positioning readjustment for Tina's comfort and to more efficiently massage milk ducts during feeding      Plan of Care:   1. Continue breast feeding using 24 mm nipple shield, maybe wean shield in a couple weeks if lumps near nipple shield persist  2. Use cradle hold with his tummy turned towards you, one arm is down behind you, almost hugging you, with his head in the crook of your elbow  3. Use your boppy or support pillows to support your arms, not [place]  4. Swaddle him at night, hands up by his face so he can suck on them and soothe himself to sleep  5. Feed on one side as completely as possible before going to the other side  6. Continue using lecithin 3 capsules 3x/day for another  2 weeks before weaning number of capsules per day    Lactation Consultant: ANITA QuinteroN, RN, IBCLC    Start time:1 pm End time: 2pm    60 minute Lactation Consultation with > 50% of time spent on breastfeeding counseling and coordination of care.    [facility]  Phone (Appts): [phone]    Nurse Line: [phone]

## 2017-02-24 NOTE — MR AVS SNAPSHOT
After Visit Summary   2/24/2017    Tina Francois    MRN: [id number]           Patient Information     Date Of Birth          1990        Visit Information        Provider Department      2/24/2017 1:45 PM Michelle Reyez RN [facility]        Today's Diagnoses     Encounter for lactation counseling    -  1      Care Instructions    Plan of Care:   1. Continue breast feeding using 24 mm nipple shield, maybe wean shield in a couple weeks if lumps near nipple shield persist  2. Use cradle hold with his tummy turned towards you, one arm is down behind you, almost hugging you, with his head in the crook of your elbow  3. Use your boppy or support pillows to support your arms, not [place]  4. Swaddle him at night, hands up by his face so he can suck on them and soothe himself to sleep  5. Feed on one side as completely as possible before going to the other side  6. Continue using lecithin 3 capsules 3x/day for another  2 weeks before weaning number of capsules per day    Lactation Consultant: KARIN Quintero, RN, IBCLC    Start time:1 pm End time: 2pm    60 minute Lactation Consultation with > 50% of time spent on breastfeeding counseling and coordination of care.    [facility]  Phone (Appts): [phone]    Nurse Line: [phone]         Follow-ups after your visit        Who to contact     If you have questions or need follow up information about today's clinic visit or your schedule please contact [facility] directly at [phone].  Normal or non-critical lab and imaging results will be communicated to you by MyChart, letter or phone within 4 business days after the clinic has received the results. If you do not hear from us within 7 days, please contact the clinic through MyChart or phone. If you have a critical or abnormal lab result, we will notify you by phone as soon as  possible.  Submit refill requests through [employer] or call your pharmacy and they will forward the refill request to us. Please allow 3 business days for your refill to be completed.          Additional Information About Your Visit        [employer] Information     [employer] gives you secure access to your electronic health record. If you see a primary care provider, you can also send messages to your care team and make appointments. If you have questions, please call your primary care clinic.  If you do not have a primary care provider, please call [phone] and they will assist you.        Care EveryWhere ID     This is your Care EveryWhere ID. This could be used by other organizations to access your [place] medical records  [id number]        Your Vitals Were     Last Period                   05/01/2016 (LMP Unknown)            Blood Pressure from Last 3 Encounters:   02/01/17 113/72    Weight from Last 3 Encounters:   01/29/17 66.2 kg (146 lb)              Today, you had the following     No orders found for display       Primary Care Provider Office Phone # Fax #    Jake Cool [phone]       [facility] [address]  [place] [address]        Thank you!     Thank you for choosing [facility]  for your care. Our goal is always to provide you with excellent care. Hearing back from our patients is one way we can continue to improve our services. Please take a few minutes to complete the written survey that you may receive in the mail after your visit with us. Thank you!             Your Updated Medication List - Protect others around you: Learn how to safely use, store and throw away your medicines at www.disposemymeds.org.          This list is accurate as of: 2/24/17  2:19 PM.  Always use your most recent med list.                   Brand Name Dispense Instructions for use    acetaminophen 325 MG tablet    TYLENOL    100 tablet     Take 2 tablets (650 mg) by mouth every 4 hours as needed for mild pain or fever (greater than or equal to 38? C /100.4? F (oral) or 38.5? C/ 101.4? F (core).)       hydrocortisone 2.5 % cream      Place rectally 3 times daily as needed (hemorrhoids)       ibuprofen 400 MG tablet    ADVIL/MOTRIN    120 tablet    Take 1-2 tablets (400-800 mg) by mouth every 6 hours as needed for other (cramping)       lanolin ointment      Apply topically every hour as needed for dry skin (sore nipples)       prenatal multivitamin  plus iron 27-0.8 MG Tabs per tablet      Take 1 tablet by mouth daily       senna-docusate 8.6-50 MG per tablet    SENOKOT-S;PERICOLACE    100 tablet    Take 1-2 tablets by mouth 2 times daily       VITAMIN D (CHOLECALCIFEROL) PO      Take by mouth daily

## 2017-04-19 ENCOUNTER — HOSPITAL ENCOUNTER (INPATIENT)
Facility: CLINIC | Age: 27
LOS: 3 days | Discharge: HOME | DRG: 601 | End: 2017-04-22
Attending: OBSTETRICS & GYNECOLOGY | Admitting: OBSTETRICS & GYNECOLOGY
Payer: COMMERCIAL

## 2017-04-19 DIAGNOSIS — N61.0 MASTITIS: Primary | ICD-10-CM

## 2017-04-19 LAB
GRAM STN SPEC: ABNORMAL
MICRO REPORT STATUS: ABNORMAL
SPECIMEN SOURCE: ABNORMAL

## 2017-04-19 PROCEDURE — 12000011 ZZH R&B MS OVERFLOW

## 2017-04-19 PROCEDURE — 0H9T3ZX DRAINAGE OF RIGHT BREAST, PERCUTANEOUS APPROACH, DIAGNOSTIC: ICD-10-PCS | Performed by: RADIOLOGY

## 2017-04-19 PROCEDURE — 0H9T30Z DRAINAGE OF RIGHT BREAST WITH DRAINAGE DEVICE, PERCUTANEOUS APPROACH: ICD-10-PCS | Performed by: RADIOLOGY

## 2017-04-19 PROCEDURE — 87186 SC STD MICRODIL/AGAR DIL: CPT | Performed by: OBSTETRICS & GYNECOLOGY

## 2017-04-19 PROCEDURE — 87205 SMEAR GRAM STAIN: CPT | Performed by: OBSTETRICS & GYNECOLOGY

## 2017-04-19 PROCEDURE — 87077 CULTURE AEROBIC IDENTIFY: CPT | Performed by: OBSTETRICS & GYNECOLOGY

## 2017-04-19 PROCEDURE — 87070 CULTURE OTHR SPECIMN AEROBIC: CPT | Performed by: OBSTETRICS & GYNECOLOGY

## 2017-04-19 PROCEDURE — 25800025 ZZH RX 258: Performed by: OBSTETRICS & GYNECOLOGY

## 2017-04-19 PROCEDURE — 25000132 ZZH RX MED GY IP 250 OP 250 PS 637: Performed by: OBSTETRICS & GYNECOLOGY

## 2017-04-19 PROCEDURE — 25000128 H RX IP 250 OP 636: Performed by: OBSTETRICS & GYNECOLOGY

## 2017-04-19 PROCEDURE — 25000125 ZZHC RX 250: Performed by: OBSTETRICS & GYNECOLOGY

## 2017-04-19 RX ORDER — ACETAMINOPHEN 325 MG/1
650 TABLET ORAL EVERY 4 HOURS PRN
Status: DISCONTINUED | OUTPATIENT
Start: 2017-04-19 | End: 2017-04-22 | Stop reason: HOSPADM

## 2017-04-19 RX ORDER — AMOXICILLIN 250 MG
1 CAPSULE ORAL AT BEDTIME
Status: DISCONTINUED | OUTPATIENT
Start: 2017-04-19 | End: 2017-04-22 | Stop reason: HOSPADM

## 2017-04-19 RX ORDER — LIDOCAINE HYDROCHLORIDE 10 MG/ML
10 INJECTION, SOLUTION EPIDURAL; INFILTRATION; INTRACAUDAL; PERINEURAL ONCE
Status: COMPLETED | OUTPATIENT
Start: 2017-04-19 | End: 2017-04-19

## 2017-04-19 RX ORDER — OXYCODONE HYDROCHLORIDE 5 MG/1
5 TABLET ORAL EVERY 4 HOURS PRN
Status: DISCONTINUED | OUTPATIENT
Start: 2017-04-19 | End: 2017-04-22 | Stop reason: HOSPADM

## 2017-04-19 RX ORDER — SODIUM CHLORIDE, SODIUM LACTATE, POTASSIUM CHLORIDE, CALCIUM CHLORIDE 600; 310; 30; 20 MG/100ML; MG/100ML; MG/100ML; MG/100ML
INJECTION, SOLUTION INTRAVENOUS CONTINUOUS
Status: DISCONTINUED | OUTPATIENT
Start: 2017-04-19 | End: 2017-04-22 | Stop reason: HOSPADM

## 2017-04-19 RX ORDER — IBUPROFEN 600 MG/1
600 TABLET, FILM COATED ORAL EVERY 6 HOURS PRN
Status: DISCONTINUED | OUTPATIENT
Start: 2017-04-19 | End: 2017-04-22 | Stop reason: HOSPADM

## 2017-04-19 RX ORDER — NALOXONE HYDROCHLORIDE 0.4 MG/ML
.1-.4 INJECTION, SOLUTION INTRAMUSCULAR; INTRAVENOUS; SUBCUTANEOUS
Status: DISCONTINUED | OUTPATIENT
Start: 2017-04-19 | End: 2017-04-22 | Stop reason: HOSPADM

## 2017-04-19 RX ORDER — DICLOXACILLIN SODIUM 500 MG
500 CAPSULE ORAL EVERY 6 HOURS
Status: ON HOLD | COMMUNITY
Start: 2017-04-17 | End: 2017-04-22

## 2017-04-19 RX ORDER — CEFAZOLIN SODIUM 2 G/100ML
2 INJECTION, SOLUTION INTRAVENOUS EVERY 8 HOURS
Status: DISCONTINUED | OUTPATIENT
Start: 2017-04-19 | End: 2017-04-21

## 2017-04-19 RX ADMIN — ACETAMINOPHEN 650 MG: 325 TABLET, FILM COATED ORAL at 19:36

## 2017-04-19 RX ADMIN — SODIUM CHLORIDE, POTASSIUM CHLORIDE, SODIUM LACTATE AND CALCIUM CHLORIDE: 600; 310; 30; 20 INJECTION, SOLUTION INTRAVENOUS at 19:12

## 2017-04-19 RX ADMIN — CEFAZOLIN SODIUM 2 G: 2 INJECTION, SOLUTION INTRAVENOUS at 19:12

## 2017-04-19 RX ADMIN — LIDOCAINE HYDROCHLORIDE 100 MG: 10 INJECTION, SOLUTION EPIDURAL; INFILTRATION; INTRACAUDAL; PERINEURAL at 19:04

## 2017-04-19 RX ADMIN — ACETAMINOPHEN 650 MG: 325 TABLET, FILM COATED ORAL at 23:34

## 2017-04-19 ASSESSMENT — ACTIVITIES OF DAILY LIVING (ADL)
BATHING: 0-->INDEPENDENT
TOILETING: 0-->INDEPENDENT
FALL_HISTORY_WITHIN_LAST_SIX_MONTHS: NO
SWALLOWING: 0-->SWALLOWS FOODS/LIQUIDS WITHOUT DIFFICULTY
RETIRED_COMMUNICATION: 0-->UNDERSTANDS/COMMUNICATES WITHOUT DIFFICULTY
DRESS: 0-->INDEPENDENT
COGNITION: 0 - NO COGNITION ISSUES REPORTED
RETIRED_EATING: 0-->INDEPENDENT
AMBULATION: 0-->INDEPENDENT
TRANSFERRING: 0-->INDEPENDENT

## 2017-04-19 NOTE — IP AVS SNAPSHOT
[facility]    [address]    [facility] [address]    Phone:  [phone]    Fax:  [phone]                                       After Visit Summary   4/19/2017    Tina Francois    MRN: [id number]           After Visit Summary Signature Page     I have received my discharge instructions, and my questions have been answered. I have discussed any challenges I see with this plan with the nurse or doctor.    ..........................................................................................................................................  Patient/Patient Representative Signature      ..........................................................................................................................................  Patient Representative Print Name and Relationship to Patient    ..................................................               ................................................  Date                                            Time    ..........................................................................................................................................  Reviewed by Signature/Title    ...................................................              ..............................................  Date                                                            Time

## 2017-04-19 NOTE — PROGRESS NOTES
Drain placement in right breast by Dr. Kamraa, 8 mls of drainage removed from abcess site. Patient tolerated procedure well. Specimen sent to lab by CT tech. Vital signs remain stable. SONAL drain placed and intact small amount of tan drainage in bulb at this time. Patient taken to Peds via wheel chair.

## 2017-04-19 NOTE — H&P
Tina is a 26 year old  who is 11 weeks s/p uncomplicated vaginal delivery on 17.  Developed right breast mastitis on 17. Was treated with Dicloxicillin 500 mg qid x 10 days. Was doing well until 17 when she called the office complaining of recurrence of the right mastitis and now a lump.  She again was placed on Dicloxicillin.  Despite the Dicloxicillin the redness and lump adjacent to the nipple has spread/grown.  She also complained of fevers/chills  up to 103.5. She is currently breast feeding.     PMH unremarkable  Allergies NKA  Surgeries none  Family history diabetes, HTN, depression  Medications  Vitamins, Dicloxicillin    VSS afebrile  HEENT nl  Breasts   Right erythema and 6 cm mass along and under the nipple  Abdomen soft and nontender    Assessment 11 weeks s/p uncomplicated vaginal delivery                        Admitted for 2nd bout of mastitis worsening despite Dicloxicillin. There is a large mass palpable under and above the nipple.    Plan admit for IV ancef          U/s guided drainage of the abscess

## 2017-04-19 NOTE — PHARMACY-ADMISSION MEDICATION HISTORY
Admission medication history interview status for this patient is complete. See [facility] admission navigator for allergy information, prior to admission medications and immunization status.     Medication history interview source(s):Patient  Medication history resources (including written lists, pill bottles, clinic record):[facility] List  Primary pharmacy:[facility] [place]    Changes made to PTA medication list:  Added: Dicloxacillin  Deleted: Hydrocortisone, senna-s, vitamin d  Changed: None    Actions taken by pharmacist (provider contacted, etc): Called [facility] to clarify abx    Additional medication history information:None    Medication reconciliation/reorder completed by provider prior to medication history? No    For patients on insulin therapy: No    Prior to Admission medications    Medication Sig Last Dose Taking? Auth Provider   dicloxacillin (DYNAPEN) 500 MG capsule Take 500 mg by mouth every 6 hours x10 days 4/19/2017 at x2 Yes Unknown, Entered By History   Prenatal Vit-Fe Fumarate-FA (PRENATAL MULTIVITAMIN  PLUS IRON) 27-0.8 MG TABS per tablet Take 1 tablet by mouth daily Past Month at Unknown time Yes Reported, Patient   acetaminophen (TYLENOL) 325 MG tablet Take 2 tablets (650 mg) by mouth every 4 hours as needed for mild pain or fever (greater than or equal to 38  C /100.4  F (oral) or 38.5  C/ 101.4  F (core).) Unknown at Unknown time  Sayda Carlson PA-C   ibuprofen (ADVIL/MOTRIN) 400 MG tablet Take 1-2 tablets (400-800 mg) by mouth every 6 hours as needed for other (cramping) Unknown at Unknown time  Sayda Carlson PA-C   lanolin ointment Apply topically every hour as needed for dry skin (sore nipples) Unknown at Unknown time  Sayda Carslon PA-C

## 2017-04-19 NOTE — IP AVS SNAPSHOT
MRN:[id number]                      After Visit Summary   4/19/2017    Tina Francois    MRN: [id number]           Thank you!     Thank you for choosing [facility] for your care. Our goal is always to provide you with excellent care. Hearing back from our patients is one way we can continue to improve our services. Please take a few minutes to complete the written survey that you may receive in the mail after you visit. If you would like to speak to someone directly about your visit please contact Patient Relations at [phone]. Thank you!          Patient Information     Date Of Birth          1990        Designated Caregiver       Most Recent Value    Caregiver    Will someone help with your care after discharge? yes    Name of designated caregiver Elver     Phone number of caregiver [phone]    Caregiver address [address] [place] [address]      About your hospital stay     You were admitted on:  April 19, 2017 You last received care in the:  [facility]    You were discharged on:  April 22, 2017       Who to Call     For medical emergencies, please call 911.  For non-urgent questions about your medical care, please call your primary care provider or clinic, [phone]          Attending Provider     Provider Specialty    Sonia Hodges MD OB/Gyn       Primary Care Provider Office Phone # Fax #    Jake Cool [phone]       [facility] [address]  [facility] [address]        After Care Instructions     Activity       Your activity upon discharge: ad lang            Diet       Follow this diet upon discharge: regular                  Follow-up Appointments     Follow-up and recommended labs and tests        Return to office in a week. Sooner if not getting better.                  Pending Results     No orders found from 4/17/2017 to 4/20/2017.            Statement of Approval     Ordered           17 [id number]  I have reviewed and agree with all the recommendations and orders detailed in this document.  EFFECTIVE NOW     Approved and electronically signed by:  Sonia Hodges MD             Admission Information     Date & Time Provider Department Dept. Phone    2017 Sonia Hodges MD [facility] [phone]      Your Vitals Were     Blood Pressure Temperature Respirations Weight Last Period Pulse Oximetry    105/60 98  F (36.7  C) 16 53.3 kg (117 lb 6.4 oz) 2016 (LMP Unknown) 98%    BMI (Body Mass Index)                   22.93 kg/m2           MyChart Information     [employer] gives you secure access to your electronic health record. If you see a primary care provider, you can also send messages to your care team and make appointments. If you have questions, please call your primary care clinic.  If you do not have a primary care provider, please call [phone] and they will assist you.        Care EveryWhere ID     This is your Care EveryWhere ID. This could be used by other organizations to access your [place] medical records  [id number]           Review of your medicines      START taking        Dose / Directions    clindamycin 300 MG capsule   Commonly known as:  CLEOCIN        Dose:  300 mg   Take 1 capsule (300 mg) by mouth 4 times daily   Quantity:  40 capsule   Refills:  0         CONTINUE these medicines which have NOT CHANGED        Dose / Directions    acetaminophen 325 MG tablet   Commonly known as:  TYLENOL   Used for:   (normal spontaneous vaginal delivery)        Dose:  650 mg   Take 2 tablets (650 mg) by mouth every 4 hours as needed for mild pain or fever (greater than or equal to 38? C /100.4? F (oral) or 38.5? C/ 101.4? F (core).)   Quantity:  100 tablet   Refills:  0       ibuprofen 400 MG tablet   Commonly known as:  ADVIL/MOTRIN   Used for:   (normal spontaneous vaginal delivery)        Dose:  400-800 mg   Take 1-2 tablets (400-800 mg) by mouth  every 6 hours as needed for other (cramping)   Quantity:  120 tablet   Refills:  0       lanolin ointment   Used for:   (normal spontaneous vaginal delivery)        Apply topically every hour as needed for dry skin (sore nipples)   Refills:  0       prenatal multivitamin  plus iron 27-0.8 MG Tabs per tablet        Dose:  1 tablet   Take 1 tablet by mouth daily   Refills:  0         STOP taking     dicloxacillin 500 MG capsule   Commonly known as:  DYNAPEN                Where to get your medicines      These medications were sent [place] - [address] [facility]  [address] [facility] [address]     Phone:  [phone]     clindamycin 300 MG capsule                Protect others around you: Learn how to safely use, store and throw away your medicines at www.disposemymeds.org.             Medication List: This is a list of all your medications and when to take them. Check marks below indicate your daily home schedule. Keep this list as a reference.      Medications           Morning Afternoon Evening Bedtime As Needed    acetaminophen 325 MG tablet   Commonly known as:  TYLENOL   Take 2 tablets (650 mg) by mouth every 4 hours as needed for mild pain or fever (greater than or equal to 38? C /100.4? F (oral) or 38.5? C/ 101.4? F (core).)   Last time this was given:  650 mg on 2017 12:09 AM                                clindamycin 300 MG capsule   Commonly known as:  CLEOCIN   Take 1 capsule (300 mg) by mouth 4 times daily                                ibuprofen 400 MG tablet   Commonly known as:  ADVIL/MOTRIN   Take 1-2 tablets (400-800 mg) by mouth every 6 hours as needed for other (cramping)   Last time this was given:  600 mg on 2017  4:55 PM                                lanolin ointment   Apply topically every hour as needed for dry skin (sore nipples)                                prenatal multivitamin  plus iron 27-0.8 MG Tabs per tablet    Take 1 tablet by mouth daily

## 2017-04-19 NOTE — PROCEDURES
RADIOLOGY PROCEDURE NOTE  Patient name: Tina Francois  MRN: [id number]  : 1990    Pre-procedure diagnosis: breast abscess, right  Post-procedure diagnosis: Same    Procedure Date/Time: 2017  6:44 PM  Procedure: us guided drainage  Estimated blood loss: None  Specimen(s) collected with description: aspirate for culture  The patient tolerated the procedure well with no immediate complications.  Significant findings:abscess    See imaging dictation for procedural details.    Provider name: Angel Kamara  Assistant(s):None

## 2017-04-20 PROCEDURE — 25800025 ZZH RX 258: Performed by: OBSTETRICS & GYNECOLOGY

## 2017-04-20 PROCEDURE — 12000011 ZZH R&B MS OVERFLOW

## 2017-04-20 PROCEDURE — 25000128 H RX IP 250 OP 636: Performed by: OBSTETRICS & GYNECOLOGY

## 2017-04-20 PROCEDURE — 25000132 ZZH RX MED GY IP 250 OP 250 PS 637: Performed by: OBSTETRICS & GYNECOLOGY

## 2017-04-20 RX ADMIN — ACETAMINOPHEN 650 MG: 325 TABLET, FILM COATED ORAL at 08:34

## 2017-04-20 RX ADMIN — SODIUM CHLORIDE, POTASSIUM CHLORIDE, SODIUM LACTATE AND CALCIUM CHLORIDE: 600; 310; 30; 20 INJECTION, SOLUTION INTRAVENOUS at 03:28

## 2017-04-20 RX ADMIN — IBUPROFEN 600 MG: 600 TABLET ORAL at 09:08

## 2017-04-20 RX ADMIN — IBUPROFEN 600 MG: 600 TABLET ORAL at 03:31

## 2017-04-20 RX ADMIN — CEFAZOLIN SODIUM 2 G: 2 INJECTION, SOLUTION INTRAVENOUS at 03:28

## 2017-04-20 RX ADMIN — IBUPROFEN 600 MG: 600 TABLET ORAL at 16:55

## 2017-04-20 RX ADMIN — CEFAZOLIN SODIUM 2 G: 2 INJECTION, SOLUTION INTRAVENOUS at 11:53

## 2017-04-20 RX ADMIN — CEFAZOLIN SODIUM 2 G: 2 INJECTION, SOLUTION INTRAVENOUS at 20:01

## 2017-04-20 RX ADMIN — SODIUM CHLORIDE, POTASSIUM CHLORIDE, SODIUM LACTATE AND CALCIUM CHLORIDE: 600; 310; 30; 20 INJECTION, SOLUTION INTRAVENOUS at 14:04

## 2017-04-20 RX ADMIN — ACETAMINOPHEN 650 MG: 325 TABLET, FILM COATED ORAL at 16:55

## 2017-04-20 RX ADMIN — ACETAMINOPHEN 650 MG: 325 TABLET, FILM COATED ORAL at 03:30

## 2017-04-20 NOTE — PLAN OF CARE
Problem: Individualization  Goal: Patient Preferences  Outcome: Improving  Minimal pain except when feeding.  Up and about in room.  Caring for baby.

## 2017-04-20 NOTE — PLAN OF CARE
Problem: Goal Outcome Summary  Goal: Goal Outcome Summary  Outcome: No Change  Afebrile. VSS. Right breast firm, swollen, and reddened. Tylenol and ibuprofen given for pain control. SONAL dressing C,D,I and drained 10 ml of pink-tingled/tan colored fluid. Intake and output intact. Continue with antibiotics as ordered. Patient slept little due to baby being awake most of the night. Will continue to monitor and provide for needs.

## 2017-04-20 NOTE — PROGRESS NOTES
"2017      S: No acute overnight events.  Pt states feels \"a little better\" breast pain wise.  Denies f/c, n/v/d.  She continues to breastfeed BL.  Requiring only tylenol/ibuprofen for pain.      O: /60  Temp 97.7  F (36.5  C)  Resp 18  Wt 53.3 kg (117 lb 6.4 oz)  LMP 2016 (LMP Unknown)  SpO2 96%  BMI 22.93 kg/m2  Gen: NAD, A&Ox 3  Breast: R breast with SONAL drain with a few ccs of whitish/cloudy drainage (~30 cc overnight).  +induration and mild tenderness to palpation of R lateral/superior breast.  No significant erythema noted.  Ext: no CCE, no CT    Abscess gram stain: gram + cocci in clusters; Final culture pending      A/P: 26yo  here for recurrent mastitis/breast abscess, failed outpatient management.  She is no s/p drainage of R breast abscess by radiology yesterday (8cc), and currently on IV Ancef.  Awaiting final identification/sensitivities, but likely Staph aureus.  She has been on IV abx for less than 24 hours and drain just placed last night.  Will keep drain in for now, and continue current antibiotic regimen.  Plan for d/c home tomorrow AM if pt remains afebrile, and pending culture results.  Consider d/c home with clindamycin.  Pt understands and agrees, all questions answered.      MARIA ELENA BENOIT MD    "

## 2017-04-20 NOTE — PLAN OF CARE
Problem: Goal Outcome Summary  Goal: Goal Outcome Summary  Outcome: Improving  Afebrile, VSS this reese. Pt's VSS  post drain placement and drainage of abscess. Pt rating pain a 2 after ultrasound/drain placement. Tyl given, refusing oxycodone at this time. SONAL putting out 20 cc's of milky/tan pink tinged drainage. IV at 125/hr and pt started on Ancef. Pt nursing baby on both sides of breast and stated she felt a let down of milk this reese on L side. Eating well, up independently. R breast reddened at areola and reddened streak also visualized across breast toward sternum. Will continue to monitor.

## 2017-04-21 LAB
BACTERIA SPEC CULT: ABNORMAL
MICRO REPORT STATUS: ABNORMAL
MICROORGANISM SPEC CULT: ABNORMAL
SPECIMEN SOURCE: ABNORMAL

## 2017-04-21 PROCEDURE — 25000125 ZZHC RX 250

## 2017-04-21 PROCEDURE — 25000128 H RX IP 250 OP 636: Performed by: OBSTETRICS & GYNECOLOGY

## 2017-04-21 PROCEDURE — S0077 INJECTION, CLINDAMYCIN PHOSP: HCPCS

## 2017-04-21 PROCEDURE — 12000011 ZZH R&B MS OVERFLOW

## 2017-04-21 RX ORDER — CLINDAMYCIN IN PERCENT DEXTROSE 6 MG/ML
300 INJECTION, SOLUTION INTRAVENOUS EVERY 8 HOURS
Status: DISCONTINUED | OUTPATIENT
Start: 2017-04-21 | End: 2017-04-21

## 2017-04-21 RX ADMIN — DEXTROSE MONOHYDRATE 300 MG: 50 INJECTION, SOLUTION INTRAVENOUS at 18:42

## 2017-04-21 RX ADMIN — CEFAZOLIN SODIUM 2 G: 2 INJECTION, SOLUTION INTRAVENOUS at 03:07

## 2017-04-21 RX ADMIN — DEXTROSE MONOHYDRATE 300 MG: 50 INJECTION, SOLUTION INTRAVENOUS at 10:48

## 2017-04-21 NOTE — PLAN OF CARE
Problem: Infection, Risk/Actual (Adult)  Goal: Identify Related Risk Factors and Signs and Symptoms  Related risk factors and signs and symptoms are identified upon initiation of Human Response Clinical Practice Guideline (CPG)   Outcome: Improving  Pt VSS. Afebrile. Right breast still red but improving. Less swollen. Pain only when infant nurses. SONAL putting out milky-pink tinged fluid.

## 2017-04-21 NOTE — PLAN OF CARE
"Problem: Infection, Risk/Actual (Adult)  Goal: Identify Related Risk Factors and Signs and Symptoms  Related risk factors and signs and symptoms are identified upon initiation of Human Response Clinical Practice Guideline (CPG)   Outcome: Improving  VSS, afebrile. Denies pain this shift, declines PRN medications. Pt attempted to rest overnight, minimal sleep this shift. Ambulating ad lang in room, independent with cares. Voiding, not saving, pt reports BM on prior shift. PIV TKO to prevent loss of access, abx given as scheduled. NELIA insertion site of drain, dressing CDI. Small amount of milky pink drainage from drain this shift. Minimal redness to affected breast, pt reports \"getting better.\" Infant son with pt in room overnight, pt independent with feedings and cares to child. Alert and oriented, able to make needs known. Continue to monitor.      "

## 2017-04-21 NOTE — PROGRESS NOTES
Progress note    Patient breast feeding. Notes discomfort in right breast.     VSS afebrile  Right breast  Erythema lessening compared to this am, Mass is softer however there is a new 1cm red lump at the edge of the areola at 2:00.     SONAL draining what appears to be breast milk    Assessment  Right breast mastitis /abscess with SONAL                        Antibiotic was switched this am from Ancef to clindamycin because of minimal improvement since admission due to possible resistance. Culture pending.     Plan  Will continue IV clindamcyn            May consider removing SONAL tomorrow if further improvement

## 2017-04-21 NOTE — PLAN OF CARE
Problem: Goal Outcome Summary  Goal: Goal Outcome Summary  Outcome: Improving  Afebrile. Minimal complaints of pain controlled with PO Tylenol and PO Motrin. Walking hallways. New PIV started. Voiding. Eating and drinking. IV antibiotics continued. Noted improvement in redness and edema of right breast with decreased tenderness per patient. Patient currently resting comfortably. Will continue to monitor and provide for cares.

## 2017-04-21 NOTE — PROGRESS NOTES
April 21, 2017    HD # 3      Resting comfortably. Breast feeding. Feeling mildly improved. Still pain in the area of the mastitis/abscess    VSS afebrile  HEENT nl  Right breast= improving however there is still considerable erythema and swelling    SONAL output= 15cc over night  Appears to be milk mixed with purulent material?    Assessment  PP 11+ weeks                         Admitted for right breast abscess/mastitis                         S/p ultrasound and placement of SONAL                         Mild/slow improvement    Plan              May consider switching to clindamycin due to slow response (possible PCN resistance culture pending)

## 2017-04-22 ENCOUNTER — HOSPITAL ENCOUNTER (OUTPATIENT)
Dept: ULTRASOUND IMAGING | Facility: CLINIC | Age: 27
Discharge: HOME | End: 2017-04-22
Attending: OBSTETRICS & GYNECOLOGY | Admitting: OBSTETRICS & GYNECOLOGY
Payer: COMMERCIAL

## 2017-04-22 ENCOUNTER — APPOINTMENT (OUTPATIENT)
Dept: ULTRASOUND IMAGING | Facility: CLINIC | Age: 27
DRG: 601 | End: 2017-04-22
Attending: OBSTETRICS & GYNECOLOGY
Payer: COMMERCIAL

## 2017-04-22 VITALS
TEMPERATURE: 98.1 F | DIASTOLIC BLOOD PRESSURE: 60 MMHG | WEIGHT: 117.4 LBS | SYSTOLIC BLOOD PRESSURE: 105 MMHG | OXYGEN SATURATION: 98 % | RESPIRATION RATE: 16 BRPM | BODY MASS INDEX: 22.93 KG/M2

## 2017-04-22 PROCEDURE — S0077 INJECTION, CLINDAMYCIN PHOSP: HCPCS

## 2017-04-22 PROCEDURE — 76705 ECHO EXAM OF ABDOMEN: CPT

## 2017-04-22 PROCEDURE — 76642 ULTRASOUND BREAST LIMITED: CPT | Mod: RT

## 2017-04-22 PROCEDURE — 25800025 ZZH RX 258: Performed by: OBSTETRICS & GYNECOLOGY

## 2017-04-22 PROCEDURE — 25000125 ZZHC RX 250

## 2017-04-22 PROCEDURE — 25000132 ZZH RX MED GY IP 250 OP 250 PS 637: Performed by: OBSTETRICS & GYNECOLOGY

## 2017-04-22 RX ORDER — CLINDAMYCIN HCL 300 MG
300 CAPSULE ORAL 4 TIMES DAILY
Qty: 40 CAPSULE | Refills: 0 | Status: ON HOLD | OUTPATIENT
Start: 2017-04-22 | End: 2019-10-26

## 2017-04-22 RX ADMIN — DEXTROSE MONOHYDRATE 300 MG: 50 INJECTION, SOLUTION INTRAVENOUS at 10:41

## 2017-04-22 RX ADMIN — DEXTROSE MONOHYDRATE 300 MG: 50 INJECTION, SOLUTION INTRAVENOUS at 02:28

## 2017-04-22 RX ADMIN — SODIUM CHLORIDE, POTASSIUM CHLORIDE, SODIUM LACTATE AND CALCIUM CHLORIDE: 600; 310; 30; 20 INJECTION, SOLUTION INTRAVENOUS at 10:41

## 2017-04-22 RX ADMIN — ACETAMINOPHEN 650 MG: 325 TABLET, FILM COATED ORAL at 00:09

## 2017-04-22 NOTE — PROGRESS NOTES
HD #4    Feeling improved. Pt notes a new lump located at 1-2:00 at the edge of the right areola.     Final culture of right breast abscess fluid= Staph A      vss afebrile  Right breast = skin no longer tense/shiny, erythema has lessened, large lump is smaller however the original lump is still present and a new small lump has developed at 1-2:00 of the areola    Assess:    Right breast mastitis /abscess due to Staph A.   Improving: Erythema is less, original                      abscess is smaller. SONAL appears to be draining milk?  New smaller lump located at                            1-2:00 of the areola.    Plan      will obtain another right breast ultrasound to evaluate the abscess that the SONAL is draining              and the new lump at 1-2:00.    Continue IV clindamycin

## 2017-04-22 NOTE — PLAN OF CARE
Problem: Goal Outcome Summary  Goal: Goal Outcome Summary  Outcome: Improving  Afebrile. VSS. SONAL putting out drainage with breast milk appearance. Denies pain. Tylenol x 1 for complaints of minor HA. Voiding. PIV infusing. IV antibiotics continued. Continued improvement in edema and pinkness of right breast. Patient resting comfortably for majority of shift. Will continue to monitor and provide for cares.

## 2017-04-22 NOTE — PLAN OF CARE
Problem: Goal Outcome Summary  Goal: Goal Outcome Summary  Outcome: Improving  Afebrile, pt up and about in room, breast slightly sore, full feeling, will continue to warm pack at home. Reddness at areola decreased and streak across breast not visible, improved overall. Dressing under breast intact, no drainage visible. Pt ready for discharge. IV d'cd and medication here and reviewed. All discharge instructions reviewed and family had no further questions. Will f/u with  In 1 week.

## 2017-04-22 NOTE — PLAN OF CARE
Problem: Goal Outcome Summary  Goal: Goal Outcome Summary  Outcome: Improving  Pt noticed new bump on Rt breast; u/s done and found to be end of drain.  SONAL draining milky output; pulled in radiology.  Afebrile.  Pt states rt breast feeling engorged d/t baby not preferring to nurse over there.  Warm packs applied and encouraged mom to offer Rt breast first at each feeding and possibly needing to pump initially to soften breast for baby to latch as well as pumping if baby doesn't feed well to offer relief.  IV p/i.  To be discharged this reese on oral clindamycin.

## 2017-04-22 NOTE — PROGRESS NOTES
Afebrile. Minimal complaints of pain.Walking hallways. Voiding and stooling. Eating and drinking. IV antibiotics continued. Noted improvement in redness of right breast around rain. Patient currently resting comfortably. Will continue to monitor and provide for cares.

## 2017-04-24 ENCOUNTER — CARE COORDINATION (OUTPATIENT)
Dept: CASE MANAGEMENT | Facility: CLINIC | Age: 27
End: 2017-04-24

## 2017-04-27 ENCOUNTER — HOSPITAL ENCOUNTER (OUTPATIENT)
Dept: ULTRASOUND IMAGING | Facility: CLINIC | Age: 27
Discharge: HOME | End: 2017-04-27
Attending: OBSTETRICS & GYNECOLOGY | Admitting: OBSTETRICS & GYNECOLOGY
Payer: COMMERCIAL

## 2017-04-27 DIAGNOSIS — N61.0 MASTITIS: ICD-10-CM

## 2017-04-27 PROCEDURE — 76642 ULTRASOUND BREAST LIMITED: CPT | Mod: RT

## 2017-04-29 NOTE — DISCHARGE SUMMARY
Tina is a 26 year old  who was admitted to [facility] on 17 for right breast mastitis with abscess. She had pain, erythema and a large mass in the right breast. In addition she had fevers and chills. She was initially placed in IV Ancef. Later that evening Tina underwent ultrasound drainage of the abscess.  Cultures were obtained. A SONAL drain was also placed.     By the next morning when examination revealed minimal improvement she was switched from IV ancef to IV clindamycin for possible penicillin resistant staph a. This was confirmed the following day.     iTna slowly improved with decrease in erythema and pain. She did not have any fevers during her hospitalization.     Her drain was removed on 17. She was discharged home later that day on PO clindamycin.     She will follow up in the clinic in 1 week.

## 2018-01-07 ENCOUNTER — HEALTH MAINTENANCE LETTER (OUTPATIENT)
Age: 28
End: 2018-01-07

## 2019-05-14 LAB
BLD GP AB SCN SERPL QL: NORMAL
C TRACH DNA SPEC QL PROBE+SIG AMP: NEGATIVE
HBV SURFACE AG SERPL QL IA: NEGATIVE
HIV 1+2 AB+HIV1 P24 AG SERPL QL IA: NONREACTIVE
N GONORRHOEA DNA SPEC QL PROBE+SIG AMP: NEGATIVE
RUBELLA ANTIBODY IGG QUANTITATIVE: NORMAL IU/ML

## 2019-10-26 ENCOUNTER — ANESTHESIA EVENT (OUTPATIENT)
Dept: OBGYN | Facility: CLINIC | Age: 29
End: 2019-10-26
Payer: COMMERCIAL

## 2019-10-26 ENCOUNTER — HOSPITAL ENCOUNTER (INPATIENT)
Facility: CLINIC | Age: 29
LOS: 2 days | Discharge: HOME | End: 2019-10-28
Attending: OBSTETRICS & GYNECOLOGY | Admitting: OBSTETRICS & GYNECOLOGY
Payer: COMMERCIAL

## 2019-10-26 ENCOUNTER — ANESTHESIA (OUTPATIENT)
Dept: OBGYN | Facility: CLINIC | Age: 29
End: 2019-10-26
Payer: COMMERCIAL

## 2019-10-26 LAB
ABO + RH BLD: NORMAL
ABO + RH BLD: NORMAL
AMPHETAMINES UR QL SCN: NEGATIVE
BASOPHILS # BLD AUTO: 0 10E9/L (ref 0–0.2)
BASOPHILS NFR BLD AUTO: 0.2 %
BLD GP AB SCN SERPL QL: NORMAL
BLOOD BANK CMNT PATIENT-IMP: NORMAL
CANNABINOIDS UR QL: NEGATIVE
COCAINE UR QL: NEGATIVE
DIFFERENTIAL METHOD BLD: NORMAL
EOSINOPHIL # BLD AUTO: 0 10E9/L (ref 0–0.7)
EOSINOPHIL NFR BLD AUTO: 0.4 %
ERYTHROCYTE [DISTWIDTH] IN BLOOD BY AUTOMATED COUNT: 13.8 % (ref 10–15)
HCT VFR BLD AUTO: 38.9 % (ref 35–47)
HGB BLD-MCNC: 13.1 G/DL (ref 11.7–15.7)
IMM GRANULOCYTES # BLD: 0 10E9/L (ref 0–0.4)
IMM GRANULOCYTES NFR BLD: 0.4 %
LYMPHOCYTES # BLD AUTO: 2.3 10E9/L (ref 0.8–5.3)
LYMPHOCYTES NFR BLD AUTO: 24.3 %
MCH RBC QN AUTO: 30.3 PG (ref 26.5–33)
MCHC RBC AUTO-ENTMCNC: 33.7 G/DL (ref 31.5–36.5)
MCV RBC AUTO: 90 FL (ref 78–100)
MONOCYTES # BLD AUTO: 0.6 10E9/L (ref 0–1.3)
MONOCYTES NFR BLD AUTO: 6 %
NEUTROPHILS # BLD AUTO: 6.6 10E9/L (ref 1.6–8.3)
NEUTROPHILS NFR BLD AUTO: 68.7 %
NRBC # BLD AUTO: 0 10*3/UL
NRBC BLD AUTO-RTO: 0 /100
OPIATES UR QL SCN: NEGATIVE
PCP UR QL SCN: NEGATIVE
PLATELET # BLD AUTO: 274 10E9/L (ref 150–450)
RBC # BLD AUTO: 4.32 10E12/L (ref 3.8–5.2)
RUPTURE OF FETAL MEMBRANES BY ROM PLUS: POSITIVE
SPECIMEN EXP DATE BLD: NORMAL
WBC # BLD AUTO: 9.6 10E9/L (ref 4–11)

## 2019-10-26 PROCEDURE — 86901 BLOOD TYPING SEROLOGIC RH(D): CPT | Performed by: OBSTETRICS & GYNECOLOGY

## 2019-10-26 PROCEDURE — 85025 COMPLETE CBC W/AUTO DIFF WBC: CPT | Performed by: OBSTETRICS & GYNECOLOGY

## 2019-10-26 PROCEDURE — 00HU33Z INSERTION OF INFUSION DEVICE INTO SPINAL CANAL, PERCUTANEOUS APPROACH: ICD-10-PCS | Performed by: ANESTHESIOLOGY

## 2019-10-26 PROCEDURE — 25000125 ZZHC RX 250: Performed by: OBSTETRICS & GYNECOLOGY

## 2019-10-26 PROCEDURE — 25800030 ZZH RX IP 258 OP 636: Performed by: ANESTHESIOLOGY

## 2019-10-26 PROCEDURE — 25000128 H RX IP 250 OP 636: Performed by: OBSTETRICS & GYNECOLOGY

## 2019-10-26 PROCEDURE — 80307 DRUG TEST PRSMV CHEM ANLYZR: CPT | Performed by: OBSTETRICS & GYNECOLOGY

## 2019-10-26 PROCEDURE — 12000000 ZZH R&B MED SURG/OB

## 2019-10-26 PROCEDURE — 86900 BLOOD TYPING SEROLOGIC ABO: CPT | Performed by: OBSTETRICS & GYNECOLOGY

## 2019-10-26 PROCEDURE — 84112 EVAL AMNIOTIC FLUID PROTEIN: CPT | Performed by: OBSTETRICS & GYNECOLOGY

## 2019-10-26 PROCEDURE — 37000011 ZZH ANESTHESIA WARD SERVICE

## 2019-10-26 PROCEDURE — 86780 TREPONEMA PALLIDUM: CPT | Performed by: OBSTETRICS & GYNECOLOGY

## 2019-10-26 PROCEDURE — 72200001 ZZH LABOR CARE VAGINAL DELIVERY SINGLE

## 2019-10-26 PROCEDURE — G0463 HOSPITAL OUTPT CLINIC VISIT: HCPCS | Mod: 25

## 2019-10-26 PROCEDURE — 59025 FETAL NON-STRESS TEST: CPT

## 2019-10-26 PROCEDURE — 86850 RBC ANTIBODY SCREEN: CPT | Performed by: OBSTETRICS & GYNECOLOGY

## 2019-10-26 PROCEDURE — 88307 TISSUE EXAM BY PATHOLOGIST: CPT | Mod: 26 | Performed by: OBSTETRICS & GYNECOLOGY

## 2019-10-26 PROCEDURE — 3E0R3BZ INTRODUCTION OF ANESTHETIC AGENT INTO SPINAL CANAL, PERCUTANEOUS APPROACH: ICD-10-PCS | Performed by: ANESTHESIOLOGY

## 2019-10-26 PROCEDURE — 88307 TISSUE EXAM BY PATHOLOGIST: CPT | Performed by: OBSTETRICS & GYNECOLOGY

## 2019-10-26 PROCEDURE — 25000128 H RX IP 250 OP 636: Performed by: ANESTHESIOLOGY

## 2019-10-26 PROCEDURE — 25800030 ZZH RX IP 258 OP 636: Performed by: OBSTETRICS & GYNECOLOGY

## 2019-10-26 PROCEDURE — 36415 COLL VENOUS BLD VENIPUNCTURE: CPT | Performed by: OBSTETRICS & GYNECOLOGY

## 2019-10-26 PROCEDURE — 25000125 ZZHC RX 250: Performed by: ANESTHESIOLOGY

## 2019-10-26 RX ORDER — CARBOPROST TROMETHAMINE 250 UG/ML
250 INJECTION, SOLUTION INTRAMUSCULAR
Status: DISCONTINUED | OUTPATIENT
Start: 2019-10-26 | End: 2019-10-26

## 2019-10-26 RX ORDER — ONDANSETRON 2 MG/ML
4 INJECTION INTRAMUSCULAR; INTRAVENOUS EVERY 6 HOURS PRN
Status: DISCONTINUED | OUTPATIENT
Start: 2019-10-26 | End: 2019-10-26

## 2019-10-26 RX ORDER — ACETAMINOPHEN 325 MG/1
650 TABLET ORAL EVERY 4 HOURS PRN
Status: DISCONTINUED | OUTPATIENT
Start: 2019-10-26 | End: 2019-10-28 | Stop reason: HOSPADM

## 2019-10-26 RX ORDER — OXYCODONE AND ACETAMINOPHEN 5; 325 MG/1; MG/1
1 TABLET ORAL
Status: DISCONTINUED | OUTPATIENT
Start: 2019-10-26 | End: 2019-10-26

## 2019-10-26 RX ORDER — LANOLIN 100 %
OINTMENT (GRAM) TOPICAL
Status: DISCONTINUED | OUTPATIENT
Start: 2019-10-26 | End: 2019-10-28 | Stop reason: HOSPADM

## 2019-10-26 RX ORDER — OXYTOCIN/0.9 % SODIUM CHLORIDE 30/500 ML
100-340 PLASTIC BAG, INJECTION (ML) INTRAVENOUS CONTINUOUS PRN
Status: DISCONTINUED | OUTPATIENT
Start: 2019-10-26 | End: 2019-10-26

## 2019-10-26 RX ORDER — EPHEDRINE SULFATE 50 MG/ML
5 INJECTION, SOLUTION INTRAMUSCULAR; INTRAVENOUS; SUBCUTANEOUS
Status: DISCONTINUED | OUTPATIENT
Start: 2019-10-26 | End: 2019-10-26

## 2019-10-26 RX ORDER — OXYTOCIN 10 [USP'U]/ML
10 INJECTION, SOLUTION INTRAMUSCULAR; INTRAVENOUS
Status: DISCONTINUED | OUTPATIENT
Start: 2019-10-26 | End: 2019-10-28 | Stop reason: HOSPADM

## 2019-10-26 RX ORDER — METHYLERGONOVINE MALEATE 0.2 MG/ML
200 INJECTION INTRAVENOUS
Status: DISCONTINUED | OUTPATIENT
Start: 2019-10-26 | End: 2019-10-26

## 2019-10-26 RX ORDER — HYDROCORTISONE 2.5 %
CREAM (GRAM) TOPICAL 3 TIMES DAILY PRN
Status: DISCONTINUED | OUTPATIENT
Start: 2019-10-26 | End: 2019-10-28 | Stop reason: HOSPADM

## 2019-10-26 RX ORDER — OXYTOCIN/0.9 % SODIUM CHLORIDE 30/500 ML
1-24 PLASTIC BAG, INJECTION (ML) INTRAVENOUS CONTINUOUS
Status: DISCONTINUED | OUTPATIENT
Start: 2019-10-26 | End: 2019-10-26

## 2019-10-26 RX ORDER — AMOXICILLIN 250 MG
2 CAPSULE ORAL 2 TIMES DAILY
Status: DISCONTINUED | OUTPATIENT
Start: 2019-10-26 | End: 2019-10-28 | Stop reason: HOSPADM

## 2019-10-26 RX ORDER — IBUPROFEN 400 MG/1
800 TABLET, FILM COATED ORAL EVERY 6 HOURS PRN
Status: DISCONTINUED | OUTPATIENT
Start: 2019-10-26 | End: 2019-10-28 | Stop reason: HOSPADM

## 2019-10-26 RX ORDER — PENICILLIN G POTASSIUM 5000000 [IU]/1
5 INJECTION, POWDER, FOR SOLUTION INTRAMUSCULAR; INTRAVENOUS ONCE
Status: COMPLETED | OUTPATIENT
Start: 2019-10-26 | End: 2019-10-26

## 2019-10-26 RX ORDER — NALOXONE HYDROCHLORIDE 0.4 MG/ML
.1-.4 INJECTION, SOLUTION INTRAMUSCULAR; INTRAVENOUS; SUBCUTANEOUS
Status: DISCONTINUED | OUTPATIENT
Start: 2019-10-26 | End: 2019-10-26

## 2019-10-26 RX ORDER — NALOXONE HYDROCHLORIDE 0.4 MG/ML
.1-.4 INJECTION, SOLUTION INTRAMUSCULAR; INTRAVENOUS; SUBCUTANEOUS
Status: DISCONTINUED | OUTPATIENT
Start: 2019-10-26 | End: 2019-10-28 | Stop reason: HOSPADM

## 2019-10-26 RX ORDER — OXYTOCIN/0.9 % SODIUM CHLORIDE 30/500 ML
340 PLASTIC BAG, INJECTION (ML) INTRAVENOUS CONTINUOUS PRN
Status: DISCONTINUED | OUTPATIENT
Start: 2019-10-26 | End: 2019-10-28 | Stop reason: HOSPADM

## 2019-10-26 RX ORDER — OXYTOCIN 10 [USP'U]/ML
10 INJECTION, SOLUTION INTRAMUSCULAR; INTRAVENOUS
Status: DISCONTINUED | OUTPATIENT
Start: 2019-10-26 | End: 2019-10-26

## 2019-10-26 RX ORDER — AMOXICILLIN 250 MG
1 CAPSULE ORAL 2 TIMES DAILY
Status: DISCONTINUED | OUTPATIENT
Start: 2019-10-26 | End: 2019-10-28 | Stop reason: HOSPADM

## 2019-10-26 RX ORDER — SODIUM CHLORIDE, SODIUM LACTATE, POTASSIUM CHLORIDE, CALCIUM CHLORIDE 600; 310; 30; 20 MG/100ML; MG/100ML; MG/100ML; MG/100ML
INJECTION, SOLUTION INTRAVENOUS CONTINUOUS
Status: DISCONTINUED | OUTPATIENT
Start: 2019-10-26 | End: 2019-10-26

## 2019-10-26 RX ORDER — ONDANSETRON 4 MG/1
4 TABLET, ORALLY DISINTEGRATING ORAL EVERY 6 HOURS PRN
Status: DISCONTINUED | OUTPATIENT
Start: 2019-10-26 | End: 2019-10-26

## 2019-10-26 RX ORDER — NALBUPHINE HYDROCHLORIDE 10 MG/ML
2.5-5 INJECTION, SOLUTION INTRAMUSCULAR; INTRAVENOUS; SUBCUTANEOUS EVERY 6 HOURS PRN
Status: DISCONTINUED | OUTPATIENT
Start: 2019-10-26 | End: 2019-10-26

## 2019-10-26 RX ORDER — IBUPROFEN 400 MG/1
800 TABLET, FILM COATED ORAL
Status: DISCONTINUED | OUTPATIENT
Start: 2019-10-26 | End: 2019-10-26

## 2019-10-26 RX ORDER — FENTANYL/BUPIVACAINE/NS/PF 2-1250MCG
10 PLASTIC BAG, INJECTION (ML) INJECTION CONTINUOUS
Status: DISCONTINUED | OUTPATIENT
Start: 2019-10-26 | End: 2019-10-26

## 2019-10-26 RX ORDER — OXYTOCIN/0.9 % SODIUM CHLORIDE 30/500 ML
100 PLASTIC BAG, INJECTION (ML) INTRAVENOUS CONTINUOUS
Status: DISCONTINUED | OUTPATIENT
Start: 2019-10-26 | End: 2019-10-28 | Stop reason: HOSPADM

## 2019-10-26 RX ORDER — ACETAMINOPHEN 325 MG/1
650 TABLET ORAL EVERY 4 HOURS PRN
Status: DISCONTINUED | OUTPATIENT
Start: 2019-10-26 | End: 2019-10-26

## 2019-10-26 RX ORDER — LIDOCAINE 40 MG/G
CREAM TOPICAL
Status: DISCONTINUED | OUTPATIENT
Start: 2019-10-26 | End: 2019-10-26

## 2019-10-26 RX ORDER — BISACODYL 10 MG
10 SUPPOSITORY, RECTAL RECTAL DAILY PRN
Status: DISCONTINUED | OUTPATIENT
Start: 2019-10-28 | End: 2019-10-28 | Stop reason: HOSPADM

## 2019-10-26 RX ORDER — LIDOCAINE HYDROCHLORIDE AND EPINEPHRINE 15; 5 MG/ML; UG/ML
INJECTION, SOLUTION EPIDURAL PRN
Status: DISCONTINUED | OUTPATIENT
Start: 2019-10-26 | End: 2019-10-27 | Stop reason: HOSPADM

## 2019-10-26 RX ADMIN — Medication 8 ML: at 20:59

## 2019-10-26 RX ADMIN — BUPIVACAINE HYDROCHLORIDE 10 ML/HR: 5 INJECTION, SOLUTION EPIDURAL; INTRACAUDAL at 21:03

## 2019-10-26 RX ADMIN — SODIUM CHLORIDE 2.5 MILLION UNITS: 9 INJECTION, SOLUTION INTRAVENOUS at 19:50

## 2019-10-26 RX ADMIN — PENICILLIN G POTASSIUM 5 MILLION UNITS: 5000000 POWDER, FOR SOLUTION INTRAMUSCULAR; INTRAPLEURAL; INTRATHECAL; INTRAVENOUS at 15:50

## 2019-10-26 RX ADMIN — LIDOCAINE HYDROCHLORIDE AND EPINEPHRINE 3 ML: 15; 5 INJECTION, SOLUTION EPIDURAL at 20:57

## 2019-10-26 RX ADMIN — Medication 1 MILLI-UNITS/MIN: at 16:06

## 2019-10-26 ASSESSMENT — LIFESTYLE VARIABLES: TOBACCO_USE: 0

## 2019-10-26 ASSESSMENT — MIFFLIN-ST. JEOR: SCORE: 1288.81

## 2019-10-26 ASSESSMENT — COPD QUESTIONNAIRES: COPD: 0

## 2019-10-26 NOTE — PLAN OF CARE
Pt doing well.  Starting to breath through contractions which are 2-3 minutes apart.  Pitocin level is 8mu.  Recurrent variable decels noted with contractions for several mintues.  Repositioning helped resolve.  SVE done at 1830.  Minimal change noted, 1-2cm/60-70%/-2 posterior.   had left to take care of older son, Brock (2 1/2 y.o.), and brought him here for about 30 minutes, and has now left to bring him home to be cared for by grandparents.  He plans to return shortly.      Dr. Shanna Rowell updated via phone at 1835 with contraction frequency, pitocin level, FHTs, baseline 140, moderate variability, accelerations present, recurrent variable decels happening at time of update.  Plan is to continue to increase pitocin as needed if variable decels resolve.  Contact Dr. Rowell if variable decels continue, may need IUPC and amnioinfusion.

## 2019-10-26 NOTE — PLAN OF CARE
Multip at 35+3 presents to triage to be evaluated for leaking amniotic fluid. Pt states fluid started leaking about 1150 today. POC discussed, monitors applied, assessment obtained. ROM+ collected and sent to lab.    Positive ROM+. Updated Dr. Rowell, orders received. Report given to Otilia COX RN to assume care. Ambulate to room 215.

## 2019-10-27 LAB
HGB BLD-MCNC: 12.1 G/DL (ref 11.7–15.7)
T PALLIDUM AB SER QL: NONREACTIVE

## 2019-10-27 PROCEDURE — 36415 COLL VENOUS BLD VENIPUNCTURE: CPT | Performed by: OBSTETRICS & GYNECOLOGY

## 2019-10-27 PROCEDURE — 85018 HEMOGLOBIN: CPT | Performed by: OBSTETRICS & GYNECOLOGY

## 2019-10-27 PROCEDURE — 90686 IIV4 VACC NO PRSV 0.5 ML IM: CPT | Performed by: OBSTETRICS & GYNECOLOGY

## 2019-10-27 PROCEDURE — 25000128 H RX IP 250 OP 636: Performed by: OBSTETRICS & GYNECOLOGY

## 2019-10-27 PROCEDURE — 25000132 ZZH RX MED GY IP 250 OP 250 PS 637: Performed by: OBSTETRICS & GYNECOLOGY

## 2019-10-27 PROCEDURE — 12000035 ZZH R&B POSTPARTUM

## 2019-10-27 RX ADMIN — IBUPROFEN 800 MG: 400 TABLET ORAL at 19:55

## 2019-10-27 RX ADMIN — ACETAMINOPHEN 650 MG: 325 TABLET, FILM COATED ORAL at 13:31

## 2019-10-27 RX ADMIN — IBUPROFEN 800 MG: 400 TABLET ORAL at 13:31

## 2019-10-27 RX ADMIN — IBUPROFEN 800 MG: 400 TABLET ORAL at 06:50

## 2019-10-27 RX ADMIN — ACETAMINOPHEN 650 MG: 325 TABLET, FILM COATED ORAL at 00:25

## 2019-10-27 RX ADMIN — SENNOSIDES AND DOCUSATE SODIUM 1 TABLET: 8.6; 5 TABLET ORAL at 06:50

## 2019-10-27 RX ADMIN — ACETAMINOPHEN 650 MG: 325 TABLET, FILM COATED ORAL at 19:55

## 2019-10-27 RX ADMIN — INFLUENZA A VIRUS A/BRISBANE/02/2018 IVR-190 (H1N1) ANTIGEN (FORMALDEHYDE INACTIVATED), INFLUENZA A VIRUS A/KANSAS/14/2017 X-327 (H3N2) ANTIGEN (FORMALDEHYDE INACTIVATED), INFLUENZA B VIRUS B/PHUKET/3073/2013 ANTIGEN (FORMALDEHYDE INACTIVATED), AND INFLUENZA B VIRUS B/MARYLAND/15/2016 BX-69A ANTIGEN (FORMALDEHYDE INACTIVATED) 0.5 ML: 15; 15; 15; 15 INJECTION, SUSPENSION INTRAMUSCULAR at 14:54

## 2019-10-27 RX ADMIN — ACETAMINOPHEN 650 MG: 325 TABLET, FILM COATED ORAL at 06:50

## 2019-10-27 RX ADMIN — IBUPROFEN 800 MG: 400 TABLET ORAL at 00:26

## 2019-10-27 NOTE — PLAN OF CARE
Data: Tina Francois transferred to [facility] via wheelchair at 0100. Baby transferred via parent's arms.  Action: Receiving unit notified of transfer: Yes. Patient and family notified of room change. Report given to Genaro BAPTISTE RN at 2100. Belongings sent to receiving unit. Accompanied by Registered Nurse. Oriented patient to surroundings. Call light within reach. ID bands double-checked with receiving RN.  Response: Patient tolerated transfer and is stable.

## 2019-10-27 NOTE — PROGRESS NOTES
"Labor Progress Note:    S: Pt is here with leaking fluid and pitocin induction for PPROM.     O:  BP 93/54   Temp 98.7  F (37.1  C) (Temporal)   Resp 16   Ht 1.499 m (4' 11\")   Wt 65.3 kg (144 lb)   SpO2 97%   BMI 29.08 kg/m    SVE: 4/80/-2 IUPC and FSE placed. Wine colored amniotic fluid  TOCO: Q2 minutes  FHR: 130s baseline. Moderate baseline. Prior prolonged decelerations, occasional variable decelerations. Appears to have resolution with position, crane and O2    A/P: 28 yo  at 35+3/7 wks. PPROM  1. Anticipate   - Internal monitors in place  - Suspicion for mild abruption  - Monitor closely for signs of abruption. Continue toward vaginal delivery at this time.   2. Epidural for pain management  3. GBS unknown. PCN given  - RH positive.     Shanna Rowell MD    "

## 2019-10-27 NOTE — PLAN OF CARE
Pt. admitted from L&D via wheelchair and transferred to bed stand by assist. Pt. arrived with baby and was accompanied by  Elver and arrived with personal belongings. Report was taken from Kassidy WATSON in L&D. Vital signs stable. Fundus is firm and midline.  Vaginal bleeding is scant, no clots or free flow. Pitocin infusing at 100ml/hr.  Pt. oriented to the room and call light system.

## 2019-10-27 NOTE — PROGRESS NOTES
"Labor Progress Note:    S: pt is comfortable with epidural.     O:  BP 97/52   Temp 98.7  F (37.1  C) (Temporal)   Resp 16   Ht 1.499 m (4' 11\")   Wt 65.3 kg (144 lb)   SpO2 97%   BMI 29.08 kg/m    SVE: /+1 internal monitors  TOCO: Q2-3 minutes   FHR: 145 baseline. Moderate baseline with intermittent minimal. Occasional late, now with pitocin off occasional variable and no late.     A/P: 28 yo  at 35+3/7 wks.   1. Anticipate .   - Monitor closely. If continues with late decelerations will proceed with c/s. OR is ready.   2. Epidural for pain management.   3. GBS unknown  - PCN given.     Shanna Rowell MD    "

## 2019-10-27 NOTE — ANESTHESIA POSTPROCEDURE EVALUATION
Patient: Tina Francois    * No procedures listed *    Diagnosis:* No pre-op diagnosis entered *  Diagnosis Additional Information: No value filed.    Anesthesia Type:  No value filed.    Note:  Anesthesia Post Evaluation    Patient location during evaluation: Bedside and Floor  Patient participation: Able to fully participate in evaluation  Level of consciousness: awake  Pain management: adequate  Airway patency: patent  Cardiovascular status: acceptable  Respiratory status: acceptable  Hydration status: acceptable  PONV: none     Anesthetic complications: None          Last vitals:  Vitals:    10/27/19 0515 10/27/19 0815 10/27/19 1331   BP: 99/65 100/65    Resp: 16 16 16   Temp: 36.3  C (97.3  F) 36.4  C (97.5  F)    SpO2:            Electronically Signed By: Chalino Landrum MD  October 27, 2019  4:37 PM

## 2019-10-27 NOTE — L&D DELIVERY NOTE
Delivery Date:  10/26/2019      PROCEDURE:  The patient is a 29-year-old G2, P1-0-0-1 at 35 and 3/7 weeks.  She was admitted today with PPROM.  She had clear fluid and was seen at 1 cm, 60%, -2 station.  She was started on Pitocin augmentation.  She had minimal change, and Pitocin was then begun to be increased.  She received epidural for pain management and made it to 4 cm.  At 4 cm, she began having a shift in baseline upward and then a major shift in baseline downward.  She was watched closely, and after receiving her epidural, I then checked her, and she was 4-5 cm.  At that point, there was some concern for late decelerations, and internal monitors were placed.  A fetal scalp electrode and IUPC were placed.  She did have recurrent lates and had a couple of episodes of bradycardia, with return to baseline with position change and oxygenation.  After a third deceleration, the Pitocin was discontinued, and at that point she had rapid transition to complete cervical dilation and +2 station.  During her labor, there was wine-colored fluid, and there was concern for abruption.  At one point, we had made ready the OR for  section; however, she had resolution in her fetal tracing at that point.  Over 2 maternal contractions, she delivered a male infant over an intact perineum with nuchal cord x 1 that was reduced on the perineum.  With the delivery of the head, there were 2 large blood clots that were delivered as well, as well as bloody fluid extravasated.  The remainder of the infant delivered atraumatically and was placed on the maternal abdomen for delayed cord clamping.  The cord was then clamped by myself and cut by the father.  The perineum was inspected and found to be intact.  The placenta delivered spontaneously, Schultze presentation, intact with a 3-vessel cord.  Apgars were 9 and 9, and counts were correct.  She will be transferred to postpartum in stable condition.         MARI DOMINGUEZ MD              D: 10/27/2019   T: 10/27/2019   MT: ARTEM      Name:     LEANDRA LAU   MRN:      [id number]        Account:        [id number]   :      1990        Delivery Date:  10/26/2019               Document: [id number]

## 2019-10-27 NOTE — PROGRESS NOTES
"PPD1  Feels well, no concerns  /65   Temp 97.5  F (36.4  C) (Oral)   Resp 16   Ht 1.499 m (4' 11\")   Wt 65.3 kg (144 lb)   SpO2 95%   BMI 29.08 kg/m     NAD  Abd Soft, NT, FF    PPD 1 s/p  @ 35 wks  Routine care  Home tomorrow    Jessica Henson MD   "

## 2019-10-27 NOTE — H&P
No significant change in general health status based on examination of the patient, review of Nursing Admission Database and prenatal record.    EFW: 6lb     Shanna Rowell MD

## 2019-10-27 NOTE — PLAN OF CARE
Vital signs stable. Postpartum assessment WDL. PIV saline locked. Pain controlled with Tylenol and ibuprofen. Patient up independently, voiding without difficulty. Breastfeeding on cue with minimal assist. Starting pumping, getting about 4ml colostrum. Patient and infant bonding well. Will continue with current plan of care.

## 2019-10-27 NOTE — LACTATION NOTE
Initial Lactation visit with Tina, VIKI, baby's big brother, and baby Elkin. Tina reports feeding is going well so far with nipple shield, and she is pumping after feedings and giving drops of EBM. Elkin feeding at time of visit in football hold to left breast with deep latch and lips flanged. Tina shared that she was able to breastfeed her first child, who is almost three, successfully for 6 months, but did share she needed to use a nipple shield for the entire time she  even with lactation support to try to wean from shield.  Feeding plan: Recommend unlimited, frequent breast feedings: At least 8 - 12 times every 24 hours. Reviewed breastfeeding with late  infants, potential for sleepier feedings at times, benefits of skin on skin for temperature regulation and feeding success. Recommended rooming in. Instructed in hand expression. Avoid pacifiers and supplementation with formula unless medically indicated. Reviewed donor milk indications and potential need for supplementation with EBM or supplementation if infant does not feed well.  Tina looking into breast pump insurance coverage for home use. Will revisit as needed. Tina appreciative of visit.    Rocio Tobin, RN-C, Lactation Educator

## 2019-10-27 NOTE — PLAN OF CARE
Data: Patient admitted to room 215 at 1518. Patient is a . Prenatal record reviewed.   OB History    Para Term  AB Living   2 1 1 0 0 1   SAB TAB Ectopic Multiple Live Births   0 0 0 0 1      # Outcome Date GA Lbr Maxx/2nd Weight Sex Delivery Anes PTL Lv   2 Current            1 Term 17 39w1d 04:21 / 00:56 3.232 kg (7 lb 2 oz) M Vag-Spont EPI  LU      Name: JEREMI LAU      Apgar1: 9  Apgar5: 9   .  Medical History: History reviewed. No pertinent past medical history..  Gestational age 35w3d. Vital signs per doc flowsheet. Fetal movement present. Patient reports Spontaneous Rupture of Membrane   as reason for admission. Support persons Elver present.  Action: Report from Delaney Rosario RN obtained at 1515. Care of patient assumed at 1515. Verbal consent for EFM, external fetal monitors applied. Admission assessment completed. Patient and support persons educated on labor process. Patient instructed to report change in fetal movement, contractions, vaginal leaking of fluid or bleeding, abdominal pain, or any concerns related to the pregnancy to her nurse/physician. Patient oriented to room, call light in reach.   Response: Dr. Shanna Rowell informed of assessment in MAC. Plan per provider is pitocin inducing labor after SROMand PCN for unknown GBS status. Patient verbalized understanding of education and verbalized agreement with plan. Patient coping with labor via relaxation techniques and possibly epidural.

## 2019-10-27 NOTE — BRIEF OP NOTE
Delivery Summary:    1. IUP at 35+3/7. PPROM.   2. Unknown GBS, PCN given.  3. Suspected abruption, category 2-3 tracing prior to delivery.   4. Epidural for pain management.   5.  of male in MARYAN presentation. Nuchal cord x 1 reduced on perineum. Clot delivered with fetal vertex.   6. Perineum intact.   7. Placenta delivered with 3-v cord. Pitocin given after.   8. EBL of 45 ml.   9. Dr. Rowell for delivery.     Shanna Rowell MD

## 2019-10-27 NOTE — ANESTHESIA PREPROCEDURE EVALUATION
"Anesthesia Pre-Procedure Evaluation    Patient: Tina Francois   MRN: [id number] : 1990          Preoperative Diagnosis: * No surgery found *        History reviewed. No pertinent past medical history.  History reviewed. No pertinent surgical history.    Anesthesia Evaluation       history and physical reviewed . Pt has had prior anesthetic. Type: General    No history of anesthetic complications          ROS/MED HX    ENT/Pulmonary:  - neg pulmonary ROS    (-) tobacco use, asthma and COPD   Neurologic:  - neg neurologic ROS    (-) CVA, TIA and Neuropathy   Cardiovascular:  - neg cardiovascular ROS      (-) hypertension, CAD, irregular heartbeat/palpitations and stent   METS/Exercise Tolerance:     Hematologic:        (-) anemia   Musculoskeletal:         GI/Hepatic:  - neg GI/hepatic ROS   (+) GERD      (-) liver disease   Renal/Genitourinary:      (-) renal disease   Endo:      (-) Type I DM, Type II DM and thyroid disease   Psychiatric:         Infectious Disease:  - neg infectious disease ROS       Malignancy:         Other:                     neg OB ROS            Physical Exam  Normal systems: cardiovascular, pulmonary and dental    Airway   Mallampati: II  TM distance: > 3 FB  Neck ROM: full  Mouth opening: > 3 cm    Dental     Cardiovascular       Pulmonary             Lab Results   Component Value Date    WBC 9.6 10/26/2019    HGB 13.1 10/26/2019    HCT 38.9 10/26/2019     10/26/2019       Preop Vitals  BP Readings from Last 3 Encounters:   10/26/19 122/74   17 105/60   17 113/72    Pulse Readings from Last 3 Encounters:   17 81      Resp Readings from Last 3 Encounters:   10/26/19 16   17 16   17 16    SpO2 Readings from Last 3 Encounters:   17 98%      Temp Readings from Last 1 Encounters:   10/26/19 37.1  C (98.7  F) (Temporal)    Ht Readings from Last 1 Encounters:   10/26/19 1.499 m (4' 11\")      Wt Readings from Last 1 Encounters:   10/26/19 " "65.3 kg (144 lb)    Estimated body mass index is 29.08 kg/m  as calculated from the following:    Height as of this encounter: 1.499 m (4' 11\").    Weight as of this encounter: 65.3 kg (144 lb).       Anesthesia Plan      History & Physical Review      ASA Status:  2 .        Plan for Epidural          Postoperative Care      Consents  Anesthetic plan, risks, benefits and alternatives discussed with:  Patient and Patient..                 Laron Pérez MD  "

## 2019-10-27 NOTE — PLAN OF CARE
VSS Pt up independently in the room. Voiding in good amounts. IV discontinued. Using Ibuprofen and tylenol for pain control. Breastfeeding infant with a nipple shield. Also pumping after feeds. Finger feeding infant with ebm after feeds. Will continue to monitor.

## 2019-10-28 VITALS
SYSTOLIC BLOOD PRESSURE: 117 MMHG | HEART RATE: 79 BPM | HEIGHT: 59 IN | RESPIRATION RATE: 16 BRPM | WEIGHT: 144 LBS | OXYGEN SATURATION: 95 % | TEMPERATURE: 98.3 F | BODY MASS INDEX: 29.03 KG/M2 | DIASTOLIC BLOOD PRESSURE: 78 MMHG

## 2019-10-28 PROCEDURE — 25000132 ZZH RX MED GY IP 250 OP 250 PS 637: Performed by: OBSTETRICS & GYNECOLOGY

## 2019-10-28 RX ADMIN — ACETAMINOPHEN 650 MG: 325 TABLET, FILM COATED ORAL at 01:32

## 2019-10-28 RX ADMIN — IBUPROFEN 800 MG: 400 TABLET ORAL at 01:32

## 2019-10-28 RX ADMIN — SENNOSIDES AND DOCUSATE SODIUM 1 TABLET: 8.6; 5 TABLET ORAL at 09:20

## 2019-10-28 RX ADMIN — IBUPROFEN 800 MG: 400 TABLET ORAL at 09:20

## 2019-10-28 RX ADMIN — ACETAMINOPHEN 650 MG: 325 TABLET, FILM COATED ORAL at 09:21

## 2019-10-28 NOTE — PLAN OF CARE
Patient up ad lang and tolerating activity well.  Fundus is firm with no free flow or clots.  Voiding without difficulty.  Taking Ibuprofen and Tylenol for discomfort.  Doing well with baby cares and feedings.    Expresses understanding of discharge instructions and follow-up appt.

## 2019-10-28 NOTE — PROGRESS NOTES
"October 28, 2019      SUBJECTIVE: No acute overnight events.  Mild abdominal cramping. +Lochia light.  Tolerating PO. Ambulating/urinating w/o difficulty.  Denies CP/palp/SOB/LH.    OBJECTIVE:  /78   Pulse 79   Temp 98.3  F (36.8  C) (Oral)   Resp 16   Ht 1.499 m (4' 11\")   Wt 65.3 kg (144 lb)   SpO2 95%   Breastfeeding? Unknown   BMI 29.08 kg/m    Gen: NAD, A&O x 3  Abd: Uterus firm, contracted, NT  Ext: mild edema BL LEs, symmetric, no CT    Hemoglobin   Date Value Ref Range Status   10/27/2019 12.1 11.7 - 15.7 g/dL Final   10/26/2019 13.1 11.7 - 15.7 g/dL Final   ]    A/P: PPD#2 s/p normal vaginal delivery.  Doing well.  Afebrile, VSS.  - ibuprofen, tylenol prn pain  - breastfeeding  - regular diet as tolerated  - routine postpartum care  - discharge home today, f/u in office in 6 weeks for routine PP visit.       MARIA ELENA BENOIT MD    "

## 2019-10-28 NOTE — PLAN OF CARE
VSS.Ibuprofen & Tylenol providing adequate pain control for perineal discomfort. Reji. Reg diet. Voiding. Breast feeds using nipple shield, SNS and supplements 7-10 ml of HDM while at breast. Pumping for EBM, few drops obtained. Instructed on need of CST, FOB will bring in morning.

## 2019-10-28 NOTE — LACTATION NOTE
Routine visit with Tina, FOB and baby.  Baby breastfeeding well without the shield now.   Parents have the shield and SNS to supplement for home.  Continuing to pump every 3 hours after feeds. Give baby EBM prior to formula supplementation.  Getting ready for discharge.  Plan: Watch for feeding cues and feed every 2-3 hours and/or on demand. Continue to use feeding log to track intake and appropriate voids and stools. Take feeding log to first follow up appointment or weight check. Encourage skin to skin to promote frequent feedings, thermoregulation and bonding. Follow-up with healthcare provider or lactation consultant for questions or concerns.    No further questions at this time. Will follow as needed. Beronica Dey BSN, RN, PHN, RNC-MNN, IBCLC

## 2019-10-29 LAB — COPATH REPORT: NORMAL

## 2019-10-31 ENCOUNTER — HOSPITAL ENCOUNTER (OUTPATIENT)
Facility: CLINIC | Age: 29
End: 2019-10-31
Admitting: OBSTETRICS & GYNECOLOGY
Payer: COMMERCIAL

## 2020-03-10 ENCOUNTER — HEALTH MAINTENANCE LETTER (OUTPATIENT)
Age: 30
End: 2020-03-10

## 2020-12-27 ENCOUNTER — HEALTH MAINTENANCE LETTER (OUTPATIENT)
Age: 30
End: 2020-12-27

## 2021-04-24 ENCOUNTER — HEALTH MAINTENANCE LETTER (OUTPATIENT)
Age: 31
End: 2021-04-24

## 2021-10-09 ENCOUNTER — HEALTH MAINTENANCE LETTER (OUTPATIENT)
Age: 31
End: 2021-10-09

## 2022-05-16 ENCOUNTER — HEALTH MAINTENANCE LETTER (OUTPATIENT)
Age: 32
End: 2022-05-16

## 2022-09-11 ENCOUNTER — HEALTH MAINTENANCE LETTER (OUTPATIENT)
Age: 32
End: 2022-09-11

## 2023-06-03 ENCOUNTER — HEALTH MAINTENANCE LETTER (OUTPATIENT)
Age: 33
End: 2023-06-03

## 2023-12-31 NOTE — ANESTHESIA PROCEDURE NOTES
Peripheral nerve/Neuraxial procedure note : epidural catheter  Pre-Procedure  Performed by Laron Pérez MD  Location: OB      Pre-Anesthestic Checklist: patient identified, IV checked, risks and benefits discussed, informed consent, monitors and equipment checked and pre-op evaluation    Timeout  Correct Patient: Yes   Correct Procedure: Yes   Correct Site: Yes     Correct Position: Yes     .   Procedure Documentation    .    Procedure: epidural catheter, .   Patient Position:sitting Insertion Site:L4-5  (midline approach) Injection technique: LORT saline   Local skin infiltrated with 3 mL of 2% lidocaine.  VAHID at 5 cm    Patient Prep/Sterile Barriers; mask, sterile gloves, povidone-iodine 7.5% surgical scrub, patient draped.  .  Needle: Touhy needle   Needle Gauge: 17.    Needle Length (Inches) 3.5   # of attempts: 1 and # of redirects:  .    Catheter: 19 G . .  Catheter threaded easily  4 cm epidural space.  9 cm at skin.   .    Assessment/Narrative  Paresthesias: No.  .  .  Aspiration negative for heme or CSF  . Test dose of 3 mL lidocaine 1.5% w/ 1:200,000 epinephrine at. Test dose negative for signs of intravascular, subdural or intrathecal injection.              Cigarettes